# Patient Record
Sex: FEMALE | Race: BLACK OR AFRICAN AMERICAN | NOT HISPANIC OR LATINO | Employment: FULL TIME | URBAN - METROPOLITAN AREA
[De-identification: names, ages, dates, MRNs, and addresses within clinical notes are randomized per-mention and may not be internally consistent; named-entity substitution may affect disease eponyms.]

---

## 2020-05-12 ENCOUNTER — COMMUNICATION - HEALTHEAST (OUTPATIENT)
Dept: BEHAVIORAL HEALTH | Facility: CLINIC | Age: 20
End: 2020-05-12

## 2020-05-14 ENCOUNTER — OFFICE VISIT - HEALTHEAST (OUTPATIENT)
Dept: BEHAVIORAL HEALTH | Facility: CLINIC | Age: 20
End: 2020-05-14

## 2020-05-14 DIAGNOSIS — F11.93 WITHDRAWAL FROM OPIOIDS (H): ICD-10-CM

## 2020-12-18 ENCOUNTER — HOSPITAL ENCOUNTER (EMERGENCY)
Facility: CLINIC | Age: 20
Discharge: HOME | End: 2020-12-19
Attending: FAMILY MEDICINE | Admitting: FAMILY MEDICINE
Payer: COMMERCIAL

## 2020-12-18 DIAGNOSIS — R45.1 AGITATION: ICD-10-CM

## 2020-12-18 DIAGNOSIS — F11.24 OPIOID DEPENDENCE WITH OPIOID-INDUCED MOOD DISORDER (H): ICD-10-CM

## 2020-12-18 PROCEDURE — 99284 EMERGENCY DEPT VISIT MOD MDM: CPT | Performed by: FAMILY MEDICINE

## 2020-12-18 PROCEDURE — 99285 EMERGENCY DEPT VISIT HI MDM: CPT | Mod: 25

## 2020-12-18 PROCEDURE — 250N000011 HC RX IP 250 OP 636

## 2020-12-18 RX ORDER — OLANZAPINE 10 MG/2ML
10 INJECTION, POWDER, FOR SOLUTION INTRAMUSCULAR ONCE
Status: COMPLETED | OUTPATIENT
Start: 2020-12-18 | End: 2020-12-18

## 2020-12-18 RX ORDER — OLANZAPINE 10 MG/2ML
INJECTION, POWDER, FOR SOLUTION INTRAMUSCULAR
Status: COMPLETED
Start: 2020-12-18 | End: 2020-12-18

## 2020-12-18 RX ADMIN — OLANZAPINE 10 MG: 10 INJECTION, POWDER, LYOPHILIZED, FOR SOLUTION INTRAMUSCULAR at 16:13

## 2020-12-18 RX ADMIN — OLANZAPINE 10 MG: 10 INJECTION, POWDER, FOR SOLUTION INTRAMUSCULAR at 16:13

## 2020-12-18 NOTE — ED NOTES
Attempt to take off restraints due to patient being calm, but as restraints were coming off patient became combative with staff. Pt was told the criteria to get restraints taken off, and unable to demonstrate at this time.

## 2020-12-18 NOTE — ED TRIAGE NOTES
SPF states that patient states at a hotel that they have converted into a homeless shelter, had a manic episode, and made her way to [facility] but was discharged, and then made her way back to the facility, they state that she can flip out really quick.

## 2020-12-18 NOTE — ED NOTES
Bed: ED11  Expected date:   Expected time:   Means of arrival:   Comments:  SPF [age] F needs security

## 2020-12-18 NOTE — ED PROVIDER NOTES
ED Provider Note  [facility]      History     Chief Complaint   Patient presents with     Aggressive Behavior     HPI  Russell Lynn is a 20 year old female who presents to the ED today via EMS from [facility] for aggressive behavior. Per [facility] staff the patient was brought there by her shelter manager. They had gotten in a confrontation and the patient was highly agitated. She was put in a room and became very confrontational with the ED staff, accusing them of racism. She was given 1 mg of Ativan. When the nursing staff came in to check on her she was attempting to steal containers of wipes and gloves. They attempted to discharge the patient and noticed that she had wash cloths and other supplies stuffed into her bag. She was then asked to leave the hospital. She came back into the lobby a second time and was asked to leave and escorted out. She then came in a third time and urinated in the lobby of the ED. [facility] was then called and brought the patient here.  On arrival here the patient was extremely agitated and oppositional became physically aggressive and almost immediately required a 21 alert with restraints and medication including IM Zyprexa.  Patient then was very sleepy did not have any obvious harm secondary to the initial restraint and was continuing to remain sedated when taken out of restraints.  I was able to interview the supervisor of the shelter in which the patient is currently living she said that she has had a history of manic behaviors but today was an extreme case of richard for her.    Past Medical History  History reviewed. No pertinent past medical history.  History reviewed. No pertinent surgical history.  No current outpatient medications on file.    No Known Allergies  Family History  No family history on file.  Social History   Social History     Tobacco Use     Smoking status: None   Substance Use Topics     Alcohol use: None     Drug use:  None      Past medical history, past surgical history, medications, allergies, family history, and social history were reviewed with the patient. No additional pertinent items.       Review of Systems  A complete review of systems was performed with pertinent positives and negatives noted in the HPI, and all other systems negative.    Physical Exam   BP: (!) 85/49  Pulse: 96  Temp: 96.3  F (35.7  C)  SpO2: 98 %  Physical Exam  Constitutional:       General: She is not in acute distress.     Appearance: She is not diaphoretic.   HENT:      Head: Atraumatic.      Mouth/Throat:      Pharynx: No oropharyngeal exudate.   Eyes:      General: No scleral icterus.     Pupils: Pupils are equal, round, and reactive to light.   Cardiovascular:      Heart sounds: Normal heart sounds.   Pulmonary:      Effort: No respiratory distress.      Breath sounds: Normal breath sounds.   Abdominal:      General: Bowel sounds are normal.      Palpations: Abdomen is soft.      Tenderness: There is no abdominal tenderness.   Musculoskeletal:         General: No tenderness.   Skin:     General: Skin is warm.      Findings: No rash.   Neurological:      General: No focal deficit present.      Motor: No weakness.      Coordination: Coordination normal.   Psychiatric:         Mood and Affect: Mood is anxious. Affect is labile and angry.         Speech: Speech is rapid and pressured.         Behavior: Behavior is agitated and aggressive.         Judgment: Judgment is impulsive.       ED Course      Procedures    Patient has not been cooperative to have an assessment done at some point patient will be assessed.    No results found for any visits on 12/18/20.  Medications   OLANZapine (zyPREXA) injection 10 mg (has no administration in time range)   OLANZapine (zyPREXA) 10 MG injection (has no administration in time range)   OLANZapine (zyPREXA) injection 10 mg (10 mg Intramuscular Given 12/18/20 1613)        Assessments & Plan (with Medical  Decision Making)       I have reviewed the nursing notes. I have reviewed the findings, diagnosis, plan and need for follow up with the patient.    Patient with history of bipolar disorder now with severe manic episode physically aggressive and violent requiring 21 alert and sedation patient will remain in the emergency room until bed becomes available on inpatient unit she will be admitted on a 72-hour hold.    Final diagnoses:   Bipolar affective disorder, currently manic, severe, with psychotic features (H)       --  Pawel Mariee MD  [facility] EMERGENCY DEPARTMENT  12/18/2020     Pawel Mariee MD  12/19/20 0041

## 2020-12-18 NOTE — ED AVS SNAPSHOT
[facility] Emergency Department  [address]  [place] [address]  Phone: [phone]  Fax: [phone]                                    Russell Lynn   MRN: [id number]    Department: [facility] Emergency Department   Date of Visit: 12/18/2020           After Visit Summary Signature Page    I have received my discharge instructions, and my questions have been answered. I have discussed any challenges I see with this plan with the nurse or doctor.    ..........................................................................................................................................  Patient/Patient Representative Signature      ..........................................................................................................................................  Patient Representative Print Name and Relationship to Patient    ..................................................               ................................................  Date                                   Time    ..........................................................................................................................................  Reviewed by Signature/Title    ...................................................              ..............................................  Date                                               Time          22EPIC Rev 08/18

## 2020-12-18 NOTE — ED NOTES
Pt arrived in ER and upon safety screen RN heard yelling from patient and she attempted to leave room, and then when on the ground began to hit her head on the floor and would not stop. At this time CODE 21 was called and patient was placed in five point restraints for patient safety by MD.

## 2020-12-19 VITALS
RESPIRATION RATE: 16 BRPM | DIASTOLIC BLOOD PRESSURE: 68 MMHG | HEART RATE: 109 BPM | SYSTOLIC BLOOD PRESSURE: 99 MMHG | TEMPERATURE: 96.3 F | OXYGEN SATURATION: 100 %

## 2020-12-19 PROCEDURE — 90791 PSYCH DIAGNOSTIC EVALUATION: CPT

## 2020-12-19 PROCEDURE — 250N000011 HC RX IP 250 OP 636

## 2020-12-19 RX ORDER — OLANZAPINE 10 MG/2ML
10 INJECTION, POWDER, FOR SOLUTION INTRAMUSCULAR ONCE
Status: COMPLETED | OUTPATIENT
Start: 2020-12-19 | End: 2020-12-19

## 2020-12-19 RX ORDER — OLANZAPINE 10 MG/2ML
INJECTION, POWDER, FOR SOLUTION INTRAMUSCULAR
Status: COMPLETED
Start: 2020-12-19 | End: 2020-12-19

## 2020-12-19 RX ADMIN — OLANZAPINE 10 MG: 10 INJECTION, POWDER, FOR SOLUTION INTRAMUSCULAR at 00:44

## 2020-12-19 RX ADMIN — OLANZAPINE 10 MG: 10 INJECTION, POWDER, LYOPHILIZED, FOR SOLUTION INTRAMUSCULAR at 00:44

## 2020-12-19 NOTE — ED NOTES
Patient is agitated and combative, ZYprexa IM ordered for the patient. Patient was de-escalated and agreed to take the IM medication. But due to patient's unpredictable behavior, writer told the patient that she will be held while the medication is given, and pt is agreeable. While giving the medication, patient moved her lower body causing the needle to go out and made a scratch on the injection site. Controlled bleeding, band aid applied. Medication and needle change and was given on the opposite side.

## 2020-12-19 NOTE — ED NOTES
Pt prior to CODE 21 woke up and made statements of self harm so was told that she needed to change out of clothing (had strings) and brought to bathroom where she could use and change into clothes, at that time she squatted on the floor and voided and peed on the scrubs that were offered. Upon walking back to there room she threw her cup of water on the floor and then she flipped the table at staff. CODE 21 was called due to patient not responding to verbal deescalation and would not agree to create a safe environment for herself. She was walked to the room and changed out of clothes and into scrubs. She was given food and water and warm blankets and a mat to sleep on. Pt is being monitored by a 1:1.

## 2020-12-19 NOTE — ED NOTES
Patient was signed out to me by day provider.  No issues overnight during my shift.  Patient is awaiting admission and will be signed out to the oncoming physician.     Dylon Colunga,   12/19/20 0405

## 2020-12-19 NOTE — DISCHARGE INSTRUCTIONS
Please follow up with a primary care doctor to make sure you are doing OK in the next week. Come back to the emergency department if you have any worsening symptoms.

## 2020-12-19 NOTE — ED NOTES
Search was performed on pt (pt. wantanika-ed and patted). Pt continues to remain in clothes that have strings (sweatshirt and pants). Unable to remove clothing items/strings due to pt in restraints.

## 2020-12-19 NOTE — ED PROVIDER NOTES
Emergency Department Patient Sign-out       Brief HPI and ED course:  Patient is a 20 year old female signed out to me by Dr. Colunga.  See initial ED Provider note for details of the presentation. In brief, 20F from [facility] due to aggressive behavior. She was acting bizarre there and was taken by [facility] to [facility]. Physically aggressive on arrival here and given IM Zyprexa. Patient with manic behaviors in the past.     She is on a 72 hour hold will likely be admitted per prior providers to inpatient psych.    Vitals:   Patient Vitals for the past 24 hrs:   BP Temp Temp src Pulse SpO2   12/18/20 1634 (!) 85/49 96.3  F (35.7  C) Tympanic 96 98 %       Received Sign-out Plan:    Pending studies include: DEC assessment    Plan:   -Admit to psych likely, needs DEC assessment    Events after assuming care:  After care was assumed, a focused history and physical was performed. Agree with findings relayed by previous provider.     Spoke with Jillian, the DEC  and we agreed based on our individual assessments that the patient has behavior problems and was agitated but now is clear, not psychotic, not on medications, not suicidal or homicidal.     I don't think that there is a need at this time to further hold the patient or sedate the patient as she is calm and cooperative and she has a place to go. Contracted for safety.     --  Isaiah Barrett MD   Emergency Medicine     Isaiah Barrett MD  12/19/20 6121

## 2020-12-19 NOTE — ED NOTES
Within an hour after restraint an in person face to face assessment was completed at 1630, including an evaluation of the patient's immediate reaction to the intervention, behavioral assessment and review/assessment of history, drugs and medications, recent labs, etc., and behavioral condition.  The patient experienced: No adverse physical outcome from seclusion/restraint initiation.  The intervention of restraint or seclusion needs to continue.     Pawel Mariee MD  12/19/20 0024

## 2020-12-19 NOTE — ED NOTES
Report taken from JEEVAN Rosario. Patient is currently sleeping in seclusion room but with door unlocked.

## 2020-12-19 NOTE — ED NOTES
Seclusion has been discontinued, pt is able to leave room freely. At this time patient is calm, sleeping/resting and is no longer showing behaviors of hurting herself.

## 2021-06-04 VITALS — SYSTOLIC BLOOD PRESSURE: 116 MMHG | WEIGHT: 133 LBS | HEART RATE: 81 BPM | DIASTOLIC BLOOD PRESSURE: 67 MMHG

## 2021-06-08 NOTE — PROGRESS NOTES
Assessment/COWS performed by RN Mercedes LUZ  While the RN stepped out the room to give report to provider, pt got up and started pacing in the room complaining of severe pain / discomfort repeatedly saying that she wants to  lie down on the exam table. This writer stopped her from doing this due to risk of fall. After pacing for another minute pt suddenly sat, then laid down on the floor. Her responses were delayed, eyes mostly closed, and this writer had to tap her back a few times in order to get response. This writer called for help then clinic staff arrived shortly along with clinic manager and code 9 team.

## 2021-06-08 NOTE — PROGRESS NOTES
Spoke to Dr. Berumen who directed patient go to ER due to withdrawal.  Patient scored 11 on COWS and said she had not slept in a few days.  She said she last used fentanyl  Monday 5-11-20 . Patient said she went to ER in [place] yesterday for Nausea, vomiting and diarrhea.  She was drinking water during the evaluation without any nausea/vomiting.  She said she was very tired due to lack of sleep. Patient said she is not currently on any medications.

## 2021-06-08 NOTE — TELEPHONE ENCOUNTER
Attempted to reach patient, but number listed is a non-working number. Pt was referred to us by [facility] via the bridging service.

## 2021-09-17 ENCOUNTER — OFFICE VISIT (OUTPATIENT)
Dept: BEHAVIORAL HEALTH | Facility: CLINIC | Age: 21
End: 2021-09-17
Payer: COMMERCIAL

## 2021-09-17 ENCOUNTER — TELEPHONE (OUTPATIENT)
Dept: BEHAVIORAL HEALTH | Facility: CLINIC | Age: 21
End: 2021-09-17

## 2021-09-17 DIAGNOSIS — F11.20 OPIOID USE DISORDER, SEVERE, DEPENDENCE (H): Primary | ICD-10-CM

## 2021-09-17 DIAGNOSIS — F43.23 ADJUSTMENT DISORDER WITH MIXED ANXIETY AND DEPRESSED MOOD: Primary | ICD-10-CM

## 2021-09-17 DIAGNOSIS — F11.20 OPIOID USE DISORDER, SEVERE, DEPENDENCE (H): ICD-10-CM

## 2021-09-17 DIAGNOSIS — F17.200 TOBACCO USE DISORDER: ICD-10-CM

## 2021-09-17 LAB
FENTANYL UR QL: ABNORMAL
HCG UR QL: NEGATIVE

## 2021-09-17 PROCEDURE — 80307 DRUG TEST PRSMV CHEM ANLYZR: CPT | Performed by: FAMILY MEDICINE

## 2021-09-17 PROCEDURE — 90832 PSYTX W PT 30 MINUTES: CPT | Performed by: SOCIAL WORKER

## 2021-09-17 PROCEDURE — 81025 URINE PREGNANCY TEST: CPT | Performed by: FAMILY MEDICINE

## 2021-09-17 PROCEDURE — 99204 OFFICE O/P NEW MOD 45 MIN: CPT | Performed by: FAMILY MEDICINE

## 2021-09-17 RX ORDER — BUPRENORPHINE AND NALOXONE 8; 2 MG/1; MG/1
1 FILM, SOLUBLE BUCCAL; SUBLINGUAL 2 TIMES DAILY
Qty: 30 FILM | Refills: 0
Start: 2021-09-17 | End: 2021-09-24

## 2021-09-17 RX ORDER — BUPRENORPHINE AND NALOXONE 8; 2 MG/1; MG/1
1 FILM, SOLUBLE BUCCAL; SUBLINGUAL 2 TIMES DAILY
Qty: 30 FILM | Refills: 0 | Status: SHIPPED | OUTPATIENT
Start: 2021-09-17 | End: 2021-09-24

## 2021-09-17 NOTE — PROGRESS NOTES
[facility]   September 17, 2021      Behavioral Health Clinician Progress Note    Patient Name: Russell Lynn           Service Type:  Individual      Service Location:   Face to Face in Clinic     Session Start Time: 2:00pm Session End Time: 2:20pm      Session Length: 16 - 37      Attendees: Patient    Visit Activities (Refresh list every visit): Psychotherapy    Diagnostic Assessment Date: Not completed yet  Treatment Plan Review Date: Not completed yet   See Flowsheets for today's PHQ-9 and ILIANA-7 results  Previous PHQ-9: No flowsheet data found.  Previous ILIANA-7: No flowsheet data found.    MELANIE LEVEL:  No flowsheet data found.    DATA  Extended Session (60+ minutes): No  Interactive Complexity: No  Crisis: No  [facility] Patient: No    Treatment Objective(s) Addressed in This Session:  Target Behavior(s): mental health and addiction    Depressed Mood: Increase interest, engagement, and pleasure in doing things  Decrease frequency and intensity of feeling down, depressed, hopeless  Identify negative self-talk and behaviors: challenge core beliefs, myths, and actions  Improve concentration, focus, and mindfulness in daily activities   Decrease thoughts that you'd be better off dead or of suicide / self-harm  Anxiety: will experience a reduction in anxiety and will develop more effective coping skills to manage anxiety symptoms  Alcohol / Substance Use: will discuss/consider potential need for formal substance use evaluation, treatment and/or support  continue to make healthy choices regarding substance use and engage in activities / supportive services that promote sobriety    Current Stressors / Issues:  2:00pm to 2:20pm    She has been withdrawing for 4 days and she wants to be prescribed suboxone-she has tried methodone and that is not helpful as she was still able to get high she wants to block her ability to get high-she lives with her partner and some other people-she stated  that she is the only person that uses opiates-she believes is living in a safe place to stay sober-she has done some research about soboxone and that it seems like a better idea and that it will calm down physical symptoms of withdraw-once she gets sober she will go back to the old me that was work hard and having money and spending time with family and going to the and fishing, not controled by any chemical-she will work with the doctor that cares and will help me with getting on the medication and getting off the medications-regarding sleeping really bad-mood has been low and has history of suicidal thoughts-anger issues and depression and anxiety and guilt-no suicidal thoughts at this moment and she was able to contract for safety-senior living history-and she asked about some medications to help with sleeping needed at this appointment.          Progress on Treatment Objective(s) / Homework:  No improvement - CONTEMPLATION (Considering change and yet undecided); Intervened by assessing the negative and positive thinking (ambivalence) about behavior change    Motivational Interviewing    MI Intervention: Expressed Empathy/Understanding, Supported Autonomy, Collaboration, Evocation, Permission to raise concern or advise, Open-ended questions, Reflections: simple and complex and Change talk (evoked)     Change Talk Expressed by the Patient: Ability to change Reasons to change Need to change Activation Taking steps    Provider Response to Change Talk: E - Evoked more info from patient about behavior change, A - Affirmed patient's thoughts, decisions, or attempts at behavior change, R - Reflected patient's change talk and S - Summarized patient's change talk statements      Care Plan review completed: No    Medication Review:  No changes to current psychiatric medication(s)    Medication Compliance:  NA    Changes in Health Issues:   None reported    Chemical Use Review:   Substance Use: Chemical use reviewed, no active  concerns identified has been sober for the past 4 days     Tobacco Use: not reported    Assessment: Current Emotional / Mental Status (status of significant symptoms):  Risk status (Self / Other harm or suicidal ideation)  Patient has had a history of suicidal ideation: yes in the past  Patient denies current fears or concerns for personal safety.  Patient denies current or recent suicidal ideation or behaviors.  Patient denies current or recent homicidal ideation or behaviors.  Patient denies current or recent self injurious behavior or ideation.  Patient denies other safety concerns.  A safety and risk management plan has not been developed at this time, however patient was encouraged to call [place] [phone] should there be a change in any of these risk factors.    Appearance:   Appropriate   Eye Contact:   Good   Psychomotor Behavior: Normal   Attitude:   Cooperative   Orientation:   All  Speech   Rate / Production: Normal/ Responsive Normal    Volume:  Normal   Mood:    Anxious  Normal  Affect:    Appropriate   Thought Content:  Clear   Thought Form:  Coherent  Goal Directed  Logical   Insight:    Fair     Diagnoses:  1. Adjustment disorder with mixed anxiety and depressed mood    2. Opioid use disorder, severe, dependence (H)        Collateral Reports Completed:  Not Applicable    Plan: (Homework, other):  Patient was given information about behavioral services and encouraged to schedule a follow up appointment with the clinic [facility] as needed.  She was also given information about mental health symptoms and treatment options  and strategies to maintain sobriety.  CD Recommendations: Maintain Sobriety.   MARIO Mcfadden

## 2021-09-17 NOTE — PROGRESS NOTES
[facility] Initial Visit    ASSESSMENT/PLAN                                                      1. Opioid use disorder, severe, dependence (H)  Pt reports a ~3 year history of using fentanyl by intranasl route(s.)  Last reported use 9/12/21.  Pt experiencing severe opioid withdrawal currently.  Pt is interested in starting buprenorphine for treatment.     Start buprenorphine with 4mg, titrate up to 16mg/day  Baseline labs discussed, will obtain next visit per pt request due to current withdrawal symptoms.      - HCG qualitative urine  - Fentanyl Urine, Qualitative  - naloxone (NARCAN) 4 MG/0.1ML nasal spray; Spray 1 spray (4 mg) into one nostril alternating nostrils once as needed for opioid reversal every 2-3 minutes until assistance arrives  Dispense: 0.2 mL; Refill: 11  - buprenorphine HCl-naloxone HCl (SUBOXONE) 8-2 MG per film; Place 1 Film under the tongue 2 times daily After following directions to start buprenorphine  Dispense: 30 Film; Refill: 0    2. Tobacco use disorder  Evaluate pt's readiness to quit future visits       Return for 1-2 weeks, Follow up, with me, in person.        SUBJECTIVE                                                      CC/HPI:  Russell Lynn is a 29 year old female with PMH anxiety, depression, and opioid use disorder who presents to the [facility] for initial visit.      Substance Use History :  Opioids: First use: 11/2018    Most recent use: substance: perc 30/fentanyl quantity 4-5 pills daily route: snorting timing of last use: 9/12/2021     IV drug use: No   History of overdose: Yes: 5/2020  Previous treatments : Yes: methadone x 1 1/2 yrs through [facility]; stopped methadone abruptly ~5/2021  Longest period of sobriety: 8 months  Medical complications related to substance use: denies  Hepatitis C Status  negative  HIV Status negative    Other recent substance use:  Stimulants: last used meth 2 weeks  ago  Sedatives/hypnotics/anxiolytics:last used a year ago  Alcohol:denies  Tobacco:currently  Cannabis:currently  Hallucinogens:last used 2-4 weeks ago  Behavioral addictions: denies      [place] Prescription Drug Monitoring Program Reviewed:  Yes; as expected        History reviewed. No pertinent past medical history.        PAST PSYCHIATRIC HISTORY:  Diagnoses- PTSD, depression and anxiety   Suicide Attempts: Yes year ago   Hospitalizations: Yes a year ago     History reviewed. No pertinent surgical history.      Medications:  No current outpatient medications on file prior to visit.  No current facility-administered medications on file prior to visit.       No Known Allergies      No family history on file.      Social History  Housing status: with boyfriend and his step dad  Employment status: Unemployed, not seeking work  Relationship status: Partnered  Children: 1 child          REVIEW OF SYSTEMS:  General: Withdrawal symptoms as described below.  No recent fevers.   Eyes:  No vision concerns.  No jaundice.    Resp: No coughing, wheezing or shortness of breath  CV: No chest pains or palpitations  GI: No complaints other than as above  : No urinary frequency or dysuria, no discharge  Musculoskeletal: No significant muscle or joint pains other than as above.  No edema  Neurologic: No numbness, tingling, weakness, problems with balance or coordination  Psychiatric: No acute concerns other than as above.   Skin: No rashes or areas of acute infection    OBJECTIVE                                                        Clinical Opioid Withdrawal Scale (COWS)    Resting Pulse Rate  1  =     Sweating    (over past 1/2 hour) 2  =  flushed or observable moistness on face   Restlessness  3  =  frequent shifting or extraneous movements of legs/arms   Pupil size  1  =  pupils possibly larger than normal for room light   Bone or Joint Aches    (acute only) 2  =  patient reports severe diffuse aching of  "joints/muscles   Runny nose or tearing    (unrelated to cold/allergies) 2  =  nose running or tearing   GI Upset    (over past 1/2 hour) 2  =  nausea or loose stool   Tremor    (outstretched hands) 2  =  slight tremor observable   Yawning    (during assessment) 1  =  yawning once or twice during assessment   Anxiety/Irritability 2  =  patient obviously irritable or anxious   Gooseflesh skin 3  =  piloerection or skin can be felt or hairs standing up on arms     TOTAL SCORE  Add column for score   21       LMP 09/09/2021   /74   Pulse 90   Ht 1.676 m (5' 6\")   Wt 58.1 kg (128 lb)   LMP 09/09/2021   BMI 20.66 kg/m    Physical Exam  Constitutional:       Appearance: She is diaphoretic.      Comments: Seated in exam chair, appears uncomfortable due to withdrawal, changes positions frequently, answers questions with short responses, good eye contact   HENT:      Head: Normocephalic and atraumatic.      Nose: Rhinorrhea present.   Eyes:      General: No scleral icterus.  Pulmonary:      Effort: Pulmonary effort is normal.   Neurological:      Mental Status: She is alert and oriented to person, place, and time.      Motor: Tremor present.      Coordination: Coordination normal.      Gait: Gait normal.   Psychiatric:         Attention and Perception: Attention and perception normal.         Mood and Affect: Mood is anxious. Affect is flat.         Speech: Speech normal.         Behavior: Behavior is cooperative.         Thought Content: Thought content normal.      Comments: Insight and judgement are fair            Labs:    UDS: methamphetamines and THC  *POC urine drug screen does not screen for Fentanyl    Recent Results (from the past 240 hour(s))   HCG qualitative urine    Collection Time: 09/17/21  3:12 PM   Result Value Ref Range    hCG Urine Qualitative Negative Negative             Patient counseling completed today:  Discussed mechanism of action, potential risks/benefits/side effects of medications and " other recommendations above.  Discussed risk of precipitated withdrawal with initiation of buprenorphine in the presence of full opioid agonists.  Reviewed directions for initiation of buprenorphine to reduce risk of precipitated withdrawal and maximize efficacy.  Recommend pt keep naloxone in their possession and reviewed other aspects of harm reduction to reduce risk of overdose with return to use.   Recommended avoiding concurrent use of alcohol, benzodiazepines or other sedatives with buprenorphine or other opioids.  Discussed importance of avoiding isolation, building a network of supportive relationships, avoiding people/places/things associated with past use to reduce risk of relapse; including motivational interviewing regarding psychosocial treatment for addiction.     At least 45 min spent in review of medical record,  review, obtaining histories, reviewing symptoms, discussing pt's goals for treatment, counseling noted above.    [facility]  [address] [place] [address] [id number] [phone]

## 2021-09-24 ENCOUNTER — OFFICE VISIT (OUTPATIENT)
Dept: BEHAVIORAL HEALTH | Facility: CLINIC | Age: 21
End: 2021-09-24
Payer: COMMERCIAL

## 2021-09-24 VITALS — HEART RATE: 102 BPM | SYSTOLIC BLOOD PRESSURE: 116 MMHG | DIASTOLIC BLOOD PRESSURE: 69 MMHG

## 2021-09-24 DIAGNOSIS — F17.200 TOBACCO USE DISORDER: ICD-10-CM

## 2021-09-24 DIAGNOSIS — F11.20 OPIOID USE DISORDER, SEVERE, DEPENDENCE (H): Primary | ICD-10-CM

## 2021-09-24 DIAGNOSIS — F43.10 PTSD (POST-TRAUMATIC STRESS DISORDER): ICD-10-CM

## 2021-09-24 DIAGNOSIS — Z11.3 SCREEN FOR STD (SEXUALLY TRANSMITTED DISEASE): ICD-10-CM

## 2021-09-24 LAB
ALBUMIN SERPL-MCNC: 4.2 G/DL (ref 3.4–5)
ALP SERPL-CCNC: 87 U/L (ref 40–150)
ALT SERPL W P-5'-P-CCNC: 21 U/L (ref 0–50)
ANION GAP SERPL CALCULATED.3IONS-SCNC: 6 MMOL/L (ref 3–14)
AST SERPL W P-5'-P-CCNC: 15 U/L (ref 0–45)
BASOPHILS # BLD AUTO: 0 10E3/UL (ref 0–0.2)
BASOPHILS NFR BLD AUTO: 0 %
BILIRUB SERPL-MCNC: 1 MG/DL (ref 0.2–1.3)
BUN SERPL-MCNC: 8 MG/DL (ref 7–30)
CALCIUM SERPL-MCNC: 9.1 MG/DL (ref 8.5–10.1)
CHLORIDE BLD-SCNC: 108 MMOL/L (ref 94–109)
CO2 SERPL-SCNC: 25 MMOL/L (ref 20–32)
CREAT SERPL-MCNC: 0.81 MG/DL (ref 0.52–1.04)
EOSINOPHIL # BLD AUTO: 0.1 10E3/UL (ref 0–0.7)
EOSINOPHIL NFR BLD AUTO: 1 %
ERYTHROCYTE [DISTWIDTH] IN BLOOD BY AUTOMATED COUNT: 14 % (ref 10–15)
GFR SERPL CREATININE-BSD FRML MDRD: >90 ML/MIN/1.73M2
GLUCOSE BLD-MCNC: 99 MG/DL (ref 70–99)
HBV CORE AB SERPL QL IA: NONREACTIVE
HBV SURFACE AB SERPL IA-ACNC: 0.76 M[IU]/ML
HBV SURFACE AG SERPL QL IA: NONREACTIVE
HCT VFR BLD AUTO: 41.9 % (ref 35–47)
HCV AB SERPL QL IA: NONREACTIVE
HGB BLD-MCNC: 13.2 G/DL (ref 11.7–15.7)
HIV 1+2 AB+HIV1 P24 AG SERPL QL IA: NONREACTIVE
IMM GRANULOCYTES # BLD: 0 10E3/UL
IMM GRANULOCYTES NFR BLD: 0 %
LYMPHOCYTES # BLD AUTO: 2 10E3/UL (ref 0.8–5.3)
LYMPHOCYTES NFR BLD AUTO: 22 %
MCH RBC QN AUTO: 28.7 PG (ref 26.5–33)
MCHC RBC AUTO-ENTMCNC: 31.5 G/DL (ref 31.5–36.5)
MCV RBC AUTO: 91 FL (ref 78–100)
MONOCYTES # BLD AUTO: 0.5 10E3/UL (ref 0–1.3)
MONOCYTES NFR BLD AUTO: 6 %
NEUTROPHILS # BLD AUTO: 6.5 10E3/UL (ref 1.6–8.3)
NEUTROPHILS NFR BLD AUTO: 71 %
NRBC # BLD AUTO: 0 10E3/UL
NRBC BLD AUTO-RTO: 0 /100
PLATELET # BLD AUTO: 223 10E3/UL (ref 150–450)
POTASSIUM BLD-SCNC: 4.1 MMOL/L (ref 3.4–5.3)
PROT SERPL-MCNC: 7.6 G/DL (ref 6.8–8.8)
RBC # BLD AUTO: 4.6 10E6/UL (ref 3.8–5.2)
SODIUM SERPL-SCNC: 139 MMOL/L (ref 133–144)
T PALLIDUM AB SER QL: NONREACTIVE
WBC # BLD AUTO: 9.2 10E3/UL (ref 4–11)

## 2021-09-24 PROCEDURE — 85025 COMPLETE CBC W/AUTO DIFF WBC: CPT | Performed by: FAMILY MEDICINE

## 2021-09-24 PROCEDURE — 87389 HIV-1 AG W/HIV-1&-2 AB AG IA: CPT | Performed by: FAMILY MEDICINE

## 2021-09-24 PROCEDURE — 86803 HEPATITIS C AB TEST: CPT | Performed by: FAMILY MEDICINE

## 2021-09-24 PROCEDURE — 86780 TREPONEMA PALLIDUM: CPT | Performed by: FAMILY MEDICINE

## 2021-09-24 PROCEDURE — 86704 HEP B CORE ANTIBODY TOTAL: CPT | Performed by: FAMILY MEDICINE

## 2021-09-24 PROCEDURE — 80053 COMPREHEN METABOLIC PANEL: CPT | Performed by: FAMILY MEDICINE

## 2021-09-24 PROCEDURE — 99214 OFFICE O/P EST MOD 30 MIN: CPT | Performed by: FAMILY MEDICINE

## 2021-09-24 PROCEDURE — 86706 HEP B SURFACE ANTIBODY: CPT | Performed by: FAMILY MEDICINE

## 2021-09-24 PROCEDURE — 87491 CHLMYD TRACH DNA AMP PROBE: CPT | Performed by: FAMILY MEDICINE

## 2021-09-24 PROCEDURE — 87591 N.GONORRHOEAE DNA AMP PROB: CPT | Performed by: FAMILY MEDICINE

## 2021-09-24 PROCEDURE — 36415 COLL VENOUS BLD VENIPUNCTURE: CPT | Performed by: FAMILY MEDICINE

## 2021-09-24 PROCEDURE — 87340 HEPATITIS B SURFACE AG IA: CPT | Performed by: FAMILY MEDICINE

## 2021-09-24 RX ORDER — BUPRENORPHINE AND NALOXONE 8; 2 MG/1; MG/1
1 FILM, SOLUBLE BUCCAL; SUBLINGUAL 2 TIMES DAILY
Qty: 45 FILM | Refills: 0 | Status: SHIPPED | OUTPATIENT
Start: 2021-09-24 | End: 2021-09-24

## 2021-09-24 RX ORDER — IBUPROFEN 800 MG/1
TABLET, FILM COATED ORAL
COMMUNITY
Start: 2021-02-09 | End: 2021-11-08

## 2021-09-24 RX ORDER — PRAZOSIN HYDROCHLORIDE 1 MG/1
1-3 CAPSULE ORAL AT BEDTIME
Qty: 90 CAPSULE | Refills: 1 | Status: SHIPPED | OUTPATIENT
Start: 2021-09-24 | End: 2021-09-24

## 2021-09-24 RX ORDER — BUPRENORPHINE AND NALOXONE 8; 2 MG/1; MG/1
1 FILM, SOLUBLE BUCCAL; SUBLINGUAL 2 TIMES DAILY
Qty: 90 FILM | Refills: 0 | Status: SHIPPED | OUTPATIENT
Start: 2021-09-24 | End: 2021-09-27

## 2021-09-24 RX ORDER — PRAZOSIN HYDROCHLORIDE 1 MG/1
1-3 CAPSULE ORAL AT BEDTIME
Qty: 90 CAPSULE | Refills: 1 | Status: SHIPPED | OUTPATIENT
Start: 2021-09-24 | End: 2021-11-08

## 2021-09-24 NOTE — NURSING NOTE
[facility]      Rooming information:  Approximate last use of illicit opioids: 9/12/2021  Taking buprenorphine? Yes:  As prescribed? No, was taking 3 a day in the beginning    Number of buprenorphine films/tablets remaining currently: unsure  Side effects related to buprenorphine (constipation, dry mouth, sedation?) No   Narcan currently available: Yes  Other recent substance use:    NICOTINE- Yes-vape   Cannabis   If using nicotine, ready to quit? No    Point of care urine drug screen positive for: buprenorphine and THC  *[facility] urine drug screen does not screen for Fentanyl    Pregnancy Status   LMP: 9/9/2021  Birth control/barriers: denies  Interested in birth control if none currently? No  Urine pregnancy test specimen obtained and sent to lab:No negative on 9/17/2021    Insurance needs: active    Housing needs: stable    Contact information up to date? yes    3rd Party Involvement none today  (please obtain DORA if pt would like to include)    Primary care provider: Physician No Ref-Primary     Mental health provider: not currently  (follow up on MH referral if needed)    Marie Lau CMA  September 24, 2021  11:06 AM

## 2021-09-24 NOTE — PROGRESS NOTES
[facility] Return Visit    ASSESSMENT/PLAN                                                    1. Opioid use disorder, severe, dependence (H)  Pt tolerated initiation of buprenorphine, reporting control of symptoms with 24mg/day  Continue buprenorphine, prescribing at increased dose f 24mg/day  Pt agreeable to individual therapy with Grady CHACON at her next appointment  - CBC with Platelets & Differential; Future  - COMPREHENSIVE METABOLIC PANEL; Future  - Hepatitis B core antibody; Future  - Hepatitis B Surface Antibody; Future  - Hepatitis B surface antigen; Future  - Hepatitis C Screen Reflex to HCV RNA Quant and Genotype; Future  - HIV Antigen Antibody Combo; Future  - buprenorphine HCl-naloxone HCl (SUBOXONE) 8-2 MG per film; Place 1 Film under the tongue 3 times daily  Dispense: 90 Film; Refill: 0  - naloxone (NARCAN) 4 MG/0.1ML nasal spray; Spray 1 spray (4 mg) into one nostril alternating nostrils once as needed for opioid reversal every 2-3 minutes until assistance arrives  Dispense: 0.2 mL; Refill: 11  - CBC with Platelets & Differential  - COMPREHENSIVE METABOLIC PANEL  - Hepatitis B core antibody  - Hepatitis B Surface Antibody  - Hepatitis B surface antigen  - Hepatitis C Screen Reflex to HCV RNA Quant and Genotype  - HIV Antigen Antibody Combo    2. Tobacco use disorder  Not ready to quit at this time    3. Screen for STD (sexually transmitted disease)  - NEISSERIA GONORRHOEA PCR  - CHLAMYDIA TRACHOMATIS PCR  - Hepatitis B Surface Antibody; Future  - Hepatitis B surface antigen; Future  - Hepatitis C Screen Reflex to HCV RNA Quant and Genotype; Future  - HIV Antigen Antibody Combo; Future  - Treponema Abs w Reflex to RPR and Titer; Future  - Hepatitis B Surface Antibody  - Hepatitis B surface antigen  - Hepatitis C Screen Reflex to HCV RNA Quant and Genotype  - HIV Antigen Antibody Combo  - Treponema Abs w Reflex to RPR and Titer    4. PTSD (post-traumatic stress disorder)  Pt  describing symptoms consistent with nightmares in setting of PTSD.  Recalls previous experience with prazosin which she tolerated.   rx for prazosin as noted  - prazosin (MINIPRESS) 1 MG capsule; Take 1-3 capsules (1-3 mg) by mouth At Bedtime As needed for nightmares  Dispense: 90 capsule; Refill: 1           Return in about 2 weeks (around 10/8/2021).        SUBJECTIVE                                                      CC/HPI:  Russell Lynn is a 21 year old   female with PMH anxiety, depression, PTSD, and opioid use disorder on buprenorphine who presents to the [facility] for return visit.      Brief History:  Pt was first evaluated in [facility] on 9/17/21.  At that time she reported ~3 yr h/o intranasal fentanyl use.  She started buprenorphine through [facility] on 9/17/2.    Substance Use History :  Opioids: First use: 11/2018    Most recent use: substance: perc 30/fentanyl quantity 4-5 pills daily route: snorting timing of last use: 9/12/2021     IV drug use: No   History of overdose: Yes: 5/2020  Previous treatments : Yes: methadone x 1 1/2 yrs through [place] [facility]; stopped methadone abruptly ~5/2021  Longest period of sobriety: 8 months  Medical complications related to substance use: denies  Hepatitis C Status  9/24/21 HCV ab nonreactive  HIV Status 9/24/21 HIV ag/ab nonreactive    Other recent substance use:  Stimulants: last used meth 2 weeks ago  Sedatives/hypnotics/anxiolytics:last used a year ago  Alcohol:denies  Tobacco:currently  Cannabis:currently  Hallucinogens:last used 2-4 weeks ago  Behavioral addictions: denies      Pt was last seen on 9/17/21.   A/P at that time was:  1. Opioid use disorder, severe, dependence (H)  Pt reports a ~3 year history of using fentanyl by intranasal route(s.)  Last reported use 9/12/21.  Pt experiencing severe opioid withdrawal currently.  Pt is interested in starting buprenorphine for treatment.     Start buprenorphine with 4mg, titrate up to  16mg/day  Baseline labs discussed, will obtain next visit per pt request due to current withdrawal symptoms.      - HCG qualitative urine  - Fentanyl Urine, Qualitative  - naloxone (NARCAN) 4 MG/0.1ML nasal spray; Spray 1 spray (4 mg) into one nostril alternating nostrils once as needed for opioid reversal every 2-3 minutes until assistance arrives  Dispense: 0.2 mL; Refill: 11  - buprenorphine HCl-naloxone HCl (SUBOXONE) 8-2 MG per film; Place 1 Film under the tongue 2 times daily After following directions to start buprenorphine  Dispense: 30 Film; Refill: 0    2. Tobacco use disorder  Evaluate pt's readiness to quit future visits      Today, pt states she was able to start buprenorphine after our last visit, and was taking 24mg/day to adequately treat symptoms.  She has not had any other opioid use since 9/12/21. No c/o significant side effects.     Pt states she is sleeping better, but still waking up every night with symptoms of heart racing, extreme anxiety.        [place] Prescription Drug Monitoring Program Reviewed:  Yes; as expected  #30 8mg/2mg films filled 9/17/21        No past medical history on file.        PAST PSYCHIATRIC HISTORY:  Diagnoses- PTSD, depression and anxiety   Suicide Attempts: Yes 2020  Hospitalizations: Yes 2020     No past surgical history on file.      Medications:  Current Outpatient Medications   Medication Instructions     buprenorphine HCl-naloxone HCl (SUBOXONE) 8-2 MG per film 1 Film, Sublingual, 2 TIMES DAILY     ibuprofen (ADVIL/MOTRIN) 800 MG tablet TAKE 1 TABLET BY MOUTH EVERY 6 TO 8 HOURS AS NEEDED     naloxone (NARCAN) 4 mg, Alternating Nostrils, ONCE PRN, every 2-3 minutes until assistance arrives                No Known Allergies      No family history on file.      Social History  Housing status: with boyfriend and his step dad  Employment status: Unemployed, not seeking work  Relationship status: Partnered  Children: 1 child          REVIEW OF SYSTEMS:  No other  concerns      OBJECTIVE                                                        /69   Pulse 102   LMP 09/09/2021     Physical Exam  Constitutional:       Appearance: Normal appearance.   HENT:      Head: Normocephalic and atraumatic.   Eyes:      General: No scleral icterus.     Extraocular Movements: Extraocular movements intact.      Conjunctiva/sclera: Conjunctivae normal.   Pulmonary:      Effort: Pulmonary effort is normal.   Neurological:      Mental Status: She is alert and oriented to person, place, and time.      Motor: No tremor.      Coordination: Coordination normal.      Gait: Gait normal.   Psychiatric:         Attention and Perception: Attention and perception normal.         Mood and Affect: Mood and affect normal.         Speech: Speech normal.         Behavior: Behavior is cooperative.         Thought Content: Thought content normal.      Comments: Insight and judgement are fair to good           Labs:    UDS: buprenorphine and THC  *POC urine drug screen does not screen for Fentanyl    Recent Results (from the past 240 hour(s))   HCG qualitative urine    Collection Time: 09/17/21  3:12 PM   Result Value Ref Range    hCG Urine Qualitative Negative Negative   Fentanyl Urine, Qualitative    Collection Time: 09/17/21  3:12 PM   Result Value Ref Range    Fentanyl Qual Urine Screen Positive (A) Screen Negative   NEISSERIA GONORRHOEA PCR    Collection Time: 09/24/21 11:54 AM    Specimen: Urine, Voided   Result Value Ref Range    Neisseria gonorrhoeae Negative Negative   CHLAMYDIA TRACHOMATIS PCR    Collection Time: 09/24/21 11:54 AM    Specimen: Urine, Voided   Result Value Ref Range    Chlamydia trachomatis Negative Negative   COMPREHENSIVE METABOLIC PANEL    Collection Time: 09/24/21 12:05 PM   Result Value Ref Range    Sodium 139 133 - 144 mmol/L    Potassium 4.1 3.4 - 5.3 mmol/L    Chloride 108 94 - 109 mmol/L    Carbon Dioxide (CO2) 25 20 - 32 mmol/L    Anion Gap 6 3 - 14 mmol/L    Urea  Nitrogen 8 7 - 30 mg/dL    Creatinine 0.81 0.52 - 1.04 mg/dL    Calcium 9.1 8.5 - 10.1 mg/dL    Glucose 99 70 - 99 mg/dL    Alkaline Phosphatase 87 40 - 150 U/L    AST 15 0 - 45 U/L    ALT 21 0 - 50 U/L    Protein Total 7.6 6.8 - 8.8 g/dL    Albumin 4.2 3.4 - 5.0 g/dL    Bilirubin Total 1.0 0.2 - 1.3 mg/dL    GFR Estimate >90 >60 mL/min/1.73m2   Hepatitis B core antibody    Collection Time: 09/24/21 12:05 PM   Result Value Ref Range    Hepatitis B Core Antibody Total Nonreactive Nonreactive   Hepatitis B Surface Antibody    Collection Time: 09/24/21 12:05 PM   Result Value Ref Range    Hepatitis B Surface Antibody 0.76 <8.00 m[IU]/mL   Hepatitis B surface antigen    Collection Time: 09/24/21 12:05 PM   Result Value Ref Range    Hepatitis B Surface Antigen Nonreactive Nonreactive   Hepatitis C Screen Reflex to HCV RNA Quant and Genotype    Collection Time: 09/24/21 12:05 PM   Result Value Ref Range    Hepatitis C Antibody Nonreactive Nonreactive   HIV Antigen Antibody Combo    Collection Time: 09/24/21 12:05 PM   Result Value Ref Range    HIV Antigen Antibody Combo Nonreactive Nonreactive   Treponema Abs w Reflex to RPR and Titer    Collection Time: 09/24/21 12:05 PM   Result Value Ref Range    Treponema Antibody Total Nonreactive Nonreactive   CBC with platelets and differential    Collection Time: 09/24/21 12:05 PM   Result Value Ref Range    WBC Count 9.2 4.0 - 11.0 10e3/uL    RBC Count 4.60 3.80 - 5.20 10e6/uL    Hemoglobin 13.2 11.7 - 15.7 g/dL    Hematocrit 41.9 35.0 - 47.0 %    MCV 91 78 - 100 fL    MCH 28.7 26.5 - 33.0 pg    MCHC 31.5 31.5 - 36.5 g/dL    RDW 14.0 10.0 - 15.0 %    Platelet Count 223 150 - 450 10e3/uL    % Neutrophils 71 %    % Lymphocytes 22 %    % Monocytes 6 %    % Eosinophils 1 %    % Basophils 0 %    % Immature Granulocytes 0 %    NRBCs per 100 WBC 0 <1 /100    Absolute Neutrophils 6.5 1.6 - 8.3 10e3/uL    Absolute Lymphocytes 2.0 0.8 - 5.3 10e3/uL    Absolute Monocytes 0.5 0.0 - 1.3  10e3/uL    Absolute Eosinophils 0.1 0.0 - 0.7 10e3/uL    Absolute Basophils 0.0 0.0 - 0.2 10e3/uL    Absolute Immature Granulocytes 0.0 <=0.0 10e3/uL    Absolute NRBCs 0.0 10e3/uL             Patient counseling completed today:  Discussed mechanism of action, potential risks/benefits/side effects of medications and other recommendations above.    Recommend pt keep naloxone in their possession and reviewed other aspects of harm reduction to reduce risk of overdose with return to use.   Recommended avoiding concurrent use of alcohol, benzodiazepines or other sedatives with buprenorphine or other opioids.  Discussed importance of avoiding isolation, building a network of supportive relationships, avoiding people/places/things associated with past use to reduce risk of relapse; including motivational interviewing regarding psychosocial treatment for addiction.     At least 30 min spent in review of medical record,  review, obtaining histories, reviewing symptoms, discussing pt's goals for treatment, counseling noted above.    [facility]  [address] [place] [id number] [phone]

## 2021-09-26 LAB
C TRACH DNA SPEC QL NAA+PROBE: NEGATIVE
N GONORRHOEA DNA SPEC QL NAA+PROBE: NEGATIVE

## 2021-09-27 RX ORDER — BUPRENORPHINE AND NALOXONE 8; 2 MG/1; MG/1
1 FILM, SOLUBLE BUCCAL; SUBLINGUAL 3 TIMES DAILY
Qty: 90 FILM | Refills: 0 | Status: SHIPPED | OUTPATIENT
Start: 2021-09-27 | End: 2021-11-08

## 2021-10-08 ENCOUNTER — TELEPHONE (OUTPATIENT)
Dept: BEHAVIORAL HEALTH | Facility: CLINIC | Age: 21
End: 2021-10-08

## 2021-11-08 ENCOUNTER — OFFICE VISIT (OUTPATIENT)
Dept: BEHAVIORAL HEALTH | Facility: CLINIC | Age: 21
End: 2021-11-08
Payer: COMMERCIAL

## 2021-11-08 ENCOUNTER — OFFICE VISIT (OUTPATIENT)
Dept: BEHAVIORAL HEALTH | Facility: CLINIC | Age: 21
End: 2021-11-08

## 2021-11-08 VITALS
DIASTOLIC BLOOD PRESSURE: 67 MMHG | HEART RATE: 74 BPM | SYSTOLIC BLOOD PRESSURE: 104 MMHG | TEMPERATURE: 98.4 F | RESPIRATION RATE: 16 BRPM

## 2021-11-08 DIAGNOSIS — F43.10 PTSD (POST-TRAUMATIC STRESS DISORDER): ICD-10-CM

## 2021-11-08 DIAGNOSIS — F11.93 OPIOID WITHDRAWAL (H): ICD-10-CM

## 2021-11-08 DIAGNOSIS — F43.23 ADJUSTMENT DISORDER WITH MIXED ANXIETY AND DEPRESSED MOOD: Primary | ICD-10-CM

## 2021-11-08 DIAGNOSIS — F11.20 OPIOID USE DISORDER, SEVERE, DEPENDENCE (H): Primary | ICD-10-CM

## 2021-11-08 DIAGNOSIS — F11.20 OPIOID USE DISORDER, SEVERE, DEPENDENCE (H): ICD-10-CM

## 2021-11-08 LAB
FENTANYL UR QL: ABNORMAL
HCG UR QL: NEGATIVE

## 2021-11-08 PROCEDURE — 90832 PSYTX W PT 30 MINUTES: CPT | Performed by: SOCIAL WORKER

## 2021-11-08 PROCEDURE — 99214 OFFICE O/P EST MOD 30 MIN: CPT | Performed by: FAMILY MEDICINE

## 2021-11-08 PROCEDURE — 80307 DRUG TEST PRSMV CHEM ANLYZR: CPT | Performed by: FAMILY MEDICINE

## 2021-11-08 PROCEDURE — 81025 URINE PREGNANCY TEST: CPT | Performed by: FAMILY MEDICINE

## 2021-11-08 RX ORDER — PRAZOSIN HYDROCHLORIDE 1 MG/1
1-3 CAPSULE ORAL AT BEDTIME
Qty: 90 CAPSULE | Refills: 0 | Status: SHIPPED | OUTPATIENT
Start: 2021-11-08

## 2021-11-08 RX ORDER — IBUPROFEN 800 MG/1
800 TABLET, FILM COATED ORAL EVERY 8 HOURS PRN
Qty: 90 TABLET | Refills: 0 | Status: SHIPPED | OUTPATIENT
Start: 2021-11-08

## 2021-11-08 RX ORDER — BUPRENORPHINE AND NALOXONE 8; 2 MG/1; MG/1
1 FILM, SOLUBLE BUCCAL; SUBLINGUAL 3 TIMES DAILY
Qty: 30 FILM | Refills: 0 | Status: SHIPPED | OUTPATIENT
Start: 2021-11-08 | End: 2021-11-24

## 2021-11-08 NOTE — NURSING NOTE
[facility]    Patient presents to the Recovery [facility] for follow up. She relapsed on Fentanyl after finishing her last Suboxone rx.    Requests new rxs for ibuprofen 800mg and prazosin. Was not able to  from pharmacy. Wants to change all rxs to [facility] pharmacy.    Interested in birth control and NRT.    Rooming information:  Approximate last use of illicit opioids: 11/6/21, early AM  Taking buprenorphine? No, ran out    Narcan currently available: Yes  Other recent substance use:    Cannabis  and Cocaine   NICOTINE-Yes: vapes  If using nicotine, ready to quit? Yes, would like like    Point of care urine drug screen positive for: cocaine and THC  *POC urine drug screen does not screen for Fentanyl    Clinical Opioid Withdrawal Scale   COWS Scoring    Resting Pulse Rate  0  =  <=80    Sweating    (over past 1/2 hour) 3  =  beads of sweat on brow or face   Restlessness  3  =  frequent shifting or extraneous movements of legs/arms   Pupil size  0  =  pupils pinned or normal size for room light   Bone or Joint Aches    (acute only) 4  =  patient is rubbing joints or muscles and is unable to sit still because of discomfort   Runny nose or tearing    (unrelated to cold/allergies) 2  =  nose running or tearing   GI Upset    (over past 1/2 hour) 3  =  vomiting or diarrhea   Tremor    (outstretched hands) 2  =  slight tremor observable   Yawning    (during assessment) 2  =  yawning three or more times during assessment   Anxiety/Irritability 2  =  patient obviously irritable or anxious   Gooseflesh skin 3  =  piloerection or skin can be felt or hairs standing up on arms     TOTAL SCORE  Add column for score   24     Pregnancy Status   LMP: 11/4/21  Birth control/barriers: none  Interested in birth control if none currently? Yes  Urine pregnancy test specimen obtained and sent to lab:yes    PHQ-2 Score: 6    PHQ-2 ( 1999 [employer]) 11/8/2021   Q1: Little interest or pleasure in doing things  "3   Q2: Feeling down, depressed or hopeless 3   PHQ-2 Score 6      If PHQ-2 score of 3 or higher, has [facility] therapist or provider been notified? Yes    Any current suicidal ideation? Yes, passive suicidal ideation, no current plan  If yes, has [facility] therapist or provider been notified? Yes    Primary care provider: Physician No Ref-Primary     Mental health provider: none    Insurance needs: active    Housing needs: \"couch hopping,\" homeless    Contact information up to date? No phone right now. Home # on file is lyle's    3rd Party Involvement: DORA signed for Mau alvarenga RN  November 8, 2021  1:12 PM      "

## 2021-11-08 NOTE — PROGRESS NOTES
[facility]   November 8, 2021      Behavioral Health Clinician Progress Note    Patient Name: Russell Lynn           Service Type:  Individual      Service Location:   Face to Face in Clinic     Session Start Time: 1:15pm Session End Time: 1:40pm      Session Length: 16 - 37      Attendees: Patient    Visit Activities (Refresh list every visit): Psychotherapy    Diagnostic Assessment Date: Not completed yet  Treatment Plan Review Date: Not completed yet   See Flowsheets for today's PHQ-9 and ILIANA-7 results  Previous PHQ-9: No flowsheet data found.  Previous ILIANA-7: No flowsheet data found.    MELANIE LEVEL:  No flowsheet data found.    DATA  Extended Session (60+ minutes): No  Interactive Complexity: No  Crisis: No  [facility] Patient: No    Treatment Objective(s) Addressed in This Session:  Target Behavior(s): mental health and addiction    Depressed Mood: Increase interest, engagement, and pleasure in doing things  Decrease frequency and intensity of feeling down, depressed, hopeless  Identify negative self-talk and behaviors: challenge core beliefs, myths, and actions  Improve concentration, focus, and mindfulness in daily activities   Decrease thoughts that you'd be better off dead or of suicide / self-harm  Anxiety: will experience a reduction in anxiety and will develop more effective coping skills to manage anxiety symptoms  Alcohol / Substance Use: will make healthier choices in regard to substance use  will discuss/consider potential need for formal substance use evaluation, treatment and/or support  continue to make healthy choices regarding substance use and engage in activities / supportive services that promote sobriety    Current Stressors / Issues:    The patient stated that she has relapsed and was in active addiction and she was progressing so she came to the clinic to get back on Suboxone to get back sober-she has used opiates and cocaine (to wake up and be  functional)-she has to work tonight and is believing that she needs to go to work even though she has not slept well and will be working a 12 hours shift to make the money-this writer helped the patient problem solve working tonight with pro and cons and the result is the client stated to protect her transition to Suboxone she will call off for work tonight-she talked about having stress in her life and this was feeling overwhelming and the result is that she relapsed on drug use-she stated that she has a history of suicidal thoughts with plans and with being taken to the hospital for admissions due to mental health issues-she stated currently that she is not having suicidal thoughts and she has no intention to harm the self-at points during this visit the client was emotional as we talked about how life has challenges in it and that she has met challenges before that she did not know how to get through it and she was able to get through the challenges-she indorsed having anxiety issues and over thinking about things and trying to figure things out in her thoughts and feeling the pressure of anxiety-this writer named what she is going through as anxiety and the patient stated that over thinking it will not help and this writer attempted to instill hope that there are ways to improve the management of anxiety-the client was hopeful and pessimistic at the same time (amblivalent) but came to the clinic for help so there is a part of her that wants to believe she has a good shot at getting what she wants of sobriety.        Progress on Treatment Objective(s) / Homework:  Minimal progress - ACTION (Actively working towards change); Intervened by reinforcing change plan / affirming steps taken    Motivational Interviewing    MI Intervention: Expressed Empathy/Understanding, Supported Autonomy, Collaboration, Evocation, Permission to raise concern or advise, Open-ended questions, Reflections: simple and complex and Change  talk (evoked)     Change Talk Expressed by the Patient: Desire to change Reasons to change Need to change Committment to change Activation Taking steps    Provider Response to Change Talk: E - Evoked more info from patient about behavior change, A - Affirmed patient's thoughts, decisions, or attempts at behavior change, R - Reflected patient's change talk and S - Summarized patient's change talk statements      Care Plan review completed: No    Medication Review:  No changes to current psychiatric medication(s)    Medication Compliance:  NA    Changes in Health Issues:   None reported    Chemical Use Review:   Substance Use: Chemical use reviewed, no active concerns identified  had recent relapse and is getting back on suboxone today as she is meeting with medical doctor to get prescription.     Tobacco Use: not reported    Assessment: Current Emotional / Mental Status (status of significant symptoms):  Risk status (Self / Other harm or suicidal ideation)  Patient has had a history of suicidal ideation: yes in the past  Patient denies current fears or concerns for personal safety.  Patient denies current or recent suicidal ideation or behaviors.  Patient denies current or recent homicidal ideation or behaviors.  Patient denies current or recent self injurious behavior or ideation.  Patient denies other safety concerns.  A safety and risk management plan has not been developed at this time, however patient was encouraged to call [facility] should there be a change in any of these risk factors.    Appearance:   Appropriate   Eye Contact:   Good   Psychomotor Behavior: Normal   Attitude:   Cooperative   Orientation:   All  Speech   Rate / Production: Normal/ Responsive Normal    Volume:  Normal   Mood:    Anxious  Normal  Affect:    Appropriate   Thought Content:  Clear   Thought Form:  Coherent  Goal Directed  Logical   Insight:    Fair     Diagnoses:  1. Adjustment disorder with mixed anxiety and depressed  mood    2. PTSD (post-traumatic stress disorder)    3. Opioid use disorder, severe, dependence (H)        Collateral Reports Completed:  Not Applicable    Plan: (Homework, other):  Patient was given information about behavioral services and encouraged to schedule a follow up appointment with the clinic [facility] as needed.  She was also given information about mental health symptoms and treatment options  and strategies to maintain sobriety.  CD Recommendations: Maintain Sobriety.   MARIO Mcfadden

## 2021-11-08 NOTE — PROGRESS NOTES
PRC met with pt in the Recovery Clinic to introduce himself and detail services provided in the role.  Pt appeared alert oriented and open to feedback during our meeting.     Pt s tates having no immediate support or recovery needs from PRC.  PRC provided business card welcoming contact from pt for any recovery based needs.  Pt and PRC agreed to speak again during an upcoming RC appointment.     Christopher Olvear on 11/8/2021 at 2:12 PM

## 2021-11-11 ENCOUNTER — TELEPHONE (OUTPATIENT)
Dept: BEHAVIORAL HEALTH | Facility: CLINIC | Age: 21
End: 2021-11-11
Payer: COMMERCIAL

## 2021-11-11 NOTE — TELEPHONE ENCOUNTER
Attempted to reach pt to discuss progress in resuming buprenorphine and other issues from her initial visit.  No answer, left VM requesting call back.

## 2021-11-11 NOTE — PROGRESS NOTES
[facility] - Recovery Clinic Return Visit    ASSESSMENT/PLAN                                                    1. Opioid use disorder, severe, dependence (H)  Pt reporting last use of fentanyl 11/6/21, currently experiencing severe opioid withdrawal symptoms.   Resume buprenorphine starting with 4mg dose, titrate up to previously effective dose 24mg/day  Referral for chemical health assessment, encouraged her to schedule and follow recommendations  - Fentanyl Urine, Qualitative  - HCG qualitative urine  - buprenorphine HCl-naloxone HCl (SUBOXONE) 8-2 MG per film; Place 1 Film under the tongue 3 times daily  Dispense: 30 Film; Refill: 0  - naloxone (NARCAN) 4 MG/0.1ML nasal spray; Spray 1 spray (4 mg) into one nostril alternating nostrils once as needed for opioid reversal every 2-3 minutes until assistance arrives  Dispense: 0.2 mL; Refill: 11  - MENTAL HEALTH REFERRAL  - Adult; Assessments and Testing; MH/CD Assessment Center - Assess & Treat; Chemical Health Evaluation - determine appropriate level of care and admit to program; [facility]: [facility] [phone]; We will contact you to ...; Future    2. Opioid withdrawal (H)  Starting buprenorphine today  - ibuprofen (ADVIL/MOTRIN) 800 MG tablet; Take 1 tablet (800 mg) by mouth every 8 hours as needed for moderate pain  Dispense: 90 tablet; Refill: 0    3. PTSD (post-traumatic stress disorder)  - prazosin (MINIPRESS) 1 MG capsule; Take 1-3 capsules (1-3 mg) by mouth At Bedtime As needed for nightmares  Dispense: 90 capsule; Refill: 0         Return for 7-10 days, Follow up, with me, in person.        SUBJECTIVE                                                      CC/HPI:  Russell Lynn is a 21 year old   female with PMH anxiety, depression, PTSD, and opioid use disorder on buprenorphine who presents to the Recovery Clinic for return visit.      Brief History:  Pt was first evaluated in Recovery Clinic on 9/17/21.  At that time she reported ~3 yr  h/o intranasal fentanyl use.  She started buprenorphine through Recovery Clinic on 9/17/2.    Substance Use History :  Opioids: First use: 11/2018    Most recent use: substance: perc 30/fentanyl quantity 4-5 pills daily route: snorting timing of last use: 9/12/2021     IV drug use: No   History of overdose: Yes: 5/2020  Previous treatments : Yes: methadone x 1 1/2 yrs through [facility]; stopped methadone abruptly ~5/2021  Longest period of sobriety: 8 months  Medical complications related to substance use: denies  Hepatitis C Status  9/24/21 HCV ab nonreactive  HIV Status 9/24/21 HIV ag/ab nonreactive    Other recent substance use:  Stimulants: last used meth 2 weeks ago  Sedatives/hypnotics/anxiolytics:last used a year ago  Alcohol:denies  Tobacco:currently  Cannabis:currently  Hallucinogens:last used 2-4 weeks ago  Behavioral addictions: jose      Pt was last seen on 9/24/21.   A/P at that time was:  1. Opioid use disorder, severe, dependence (H)  Pt tolerated initiation of buprenorphine, reporting control of symptoms with 24mg/day  Continue buprenorphine, prescribing at increased dose f 24mg/day  Pt agreeable to individual therapy with Grady CHACON at her next appointment  - CBC with Platelets & Differential; Future  - COMPREHENSIVE METABOLIC PANEL; Future  - Hepatitis B core antibody; Future  - Hepatitis B Surface Antibody; Future  - Hepatitis B surface antigen; Future  - Hepatitis C Screen Reflex to HCV RNA Quant and Genotype; Future  - HIV Antigen Antibody Combo; Future  - buprenorphine HCl-naloxone HCl (SUBOXONE) 8-2 MG per film; Place 1 Film under the tongue 3 times daily  Dispense: 90 Film; Refill: 0  - naloxone (NARCAN) 4 MG/0.1ML nasal spray; Spray 1 spray (4 mg) into one nostril alternating nostrils once as needed for opioid reversal every 2-3 minutes until assistance arrives  Dispense: 0.2 mL; Refill: 11  - CBC with Platelets & Differential  - COMPREHENSIVE METABOLIC PANEL  - Hepatitis B core antibody  -  Hepatitis B Surface Antibody  - Hepatitis B surface antigen  - Hepatitis C Screen Reflex to HCV RNA Quant and Genotype  - HIV Antigen Antibody Combo    2. Tobacco use disorder  Not ready to quit at this time    3. Screen for STD (sexually transmitted disease)  - NEISSERIA GONORRHOEA PCR  - CHLAMYDIA TRACHOMATIS PCR  - Hepatitis B Surface Antibody; Future  - Hepatitis B surface antigen; Future  - Hepatitis C Screen Reflex to HCV RNA Quant and Genotype; Future  - HIV Antigen Antibody Combo; Future  - Treponema Abs w Reflex to RPR and Titer; Future  - Hepatitis B Surface Antibody  - Hepatitis B surface antigen  - Hepatitis C Screen Reflex to HCV RNA Quant and Genotype  - HIV Antigen Antibody Combo  - Treponema Abs w Reflex to RPR and Titer    4. PTSD (post-traumatic stress disorder)  Pt describing symptoms consistent with nightmares in setting of PTSD.  Recalls previous experience with prazosin which she tolerated.   rx for prazosin as noted  - prazosin (MINIPRESS) 1 MG capsule; Take 1-3 capsules (1-3 mg) by mouth At Bedtime As needed for nightmares  Dispense: 90 capsule; Refill: 1      Pt did not return for 10/8/21 follow up appointment.     Today, pt states she remained on buprenorphine for one month at 24mg/day, then returned to use of fentanyl, last use 11/6/21.  Currently experiencing significant withdrawal symptoms as noted in nursing documentation. She would like to resume buprenorphine treatment, and she is open to psychosocial treatment for addiction.      Pt reported to RN she was not able to  rx for ibuprofen or prazosin after her last visit, and is interested in obtaining new rx's.        [place] Prescription Drug Monitoring Program Reviewed:  Yes; as expected          No past medical history on file.        PAST PSYCHIATRIC HISTORY:  Diagnoses- PTSD, depression and anxiety   Suicide Attempts: Yes 2020  Hospitalizations: Yes 2020     No past surgical history on file.      Medications:  No current  outpatient medications on file prior to visit.  No current facility-administered medications on file prior to visit.           No Known Allergies      No family history on file.        Social History  Housing status: no permanent address  Employment status: Unemployed, not seeking work  Relationship status: has a boyfriend  Children: 1 child          REVIEW OF SYSTEMS:  No other concerns      OBJECTIVE                                                        /67   Pulse 74   Temp 98.4  F (36.9  C)   Resp 16   LMP 11/04/2021     Physical Exam  Constitutional:       Comments: Appears uncomfortable related to withdrawal   HENT:      Head: Normocephalic and atraumatic.   Eyes:      General: No scleral icterus.     Extraocular Movements: Extraocular movements intact.      Conjunctiva/sclera: Conjunctivae normal.   Pulmonary:      Effort: Pulmonary effort is normal.   Neurological:      Mental Status: She is alert and oriented to person, place, and time.      Coordination: Coordination is intact.      Gait: Gait is intact.   Psychiatric:         Attention and Perception: Attention and perception normal.         Mood and Affect: Mood is anxious and depressed. Affect is not inappropriate.         Speech: Speech normal.         Behavior: Behavior is cooperative.         Thought Content: Thought content normal.      Comments: Insight and judgement are fair to good           Labs:    UDS: cocaine and THC  *POC urine drug screen does not screen for Fentanyl    Recent Results (from the past 240 hour(s))   Fentanyl Urine, Qualitative    Collection Time: 11/08/21  1:30 PM   Result Value Ref Range    Fentanyl Qual Urine Screen Positive (A) Screen Negative   HCG qualitative urine    Collection Time: 11/08/21  1:30 PM   Result Value Ref Range    hCG Urine Qualitative Negative Negative             Patient counseling completed today:  Discussed mechanism of action, potential risks/benefits/side effects of medications and other  recommendations above.    Recommend pt keep naloxone in their possession and reviewed other aspects of harm reduction to reduce risk of overdose with return to use.   Recommended avoiding concurrent use of alcohol, benzodiazepines or other sedatives with buprenorphine or other opioids.  Discussed importance of avoiding isolation, building a network of supportive relationships, avoiding people/places/things associated with past use to reduce risk of relapse; including motivational interviewing regarding psychosocial treatment for addiction.     At least 30 min spent in review of medical record,  review, obtaining histories, reviewing symptoms, discussing pt's goals for treatment, counseling noted above.      Mercedes Howell MD  [facility]  [address] [place] [id number] [phone]

## 2021-11-15 ENCOUNTER — TELEPHONE (OUTPATIENT)
Dept: BEHAVIORAL HEALTH | Facility: CLINIC | Age: 21
End: 2021-11-15

## 2021-11-24 ENCOUNTER — OFFICE VISIT (OUTPATIENT)
Dept: BEHAVIORAL HEALTH | Facility: CLINIC | Age: 21
End: 2021-11-24
Payer: COMMERCIAL

## 2021-11-24 ENCOUNTER — OFFICE VISIT (OUTPATIENT)
Dept: BEHAVIORAL HEALTH | Facility: CLINIC | Age: 21
End: 2021-11-24

## 2021-11-24 ENCOUNTER — APPOINTMENT (OUTPATIENT)
Dept: BEHAVIORAL HEALTH | Facility: CLINIC | Age: 21
End: 2021-11-24
Payer: COMMERCIAL

## 2021-11-24 VITALS — HEART RATE: 92 BPM | DIASTOLIC BLOOD PRESSURE: 75 MMHG | SYSTOLIC BLOOD PRESSURE: 122 MMHG

## 2021-11-24 DIAGNOSIS — F11.20 OPIOID USE DISORDER, SEVERE, DEPENDENCE (H): Primary | ICD-10-CM

## 2021-11-24 DIAGNOSIS — F43.10 PTSD (POST-TRAUMATIC STRESS DISORDER): ICD-10-CM

## 2021-11-24 DIAGNOSIS — F17.200 TOBACCO USE DISORDER: ICD-10-CM

## 2021-11-24 LAB
FENTANYL UR QL: ABNORMAL
HCG UR QL: NEGATIVE

## 2021-11-24 PROCEDURE — 80307 DRUG TEST PRSMV CHEM ANLYZR: CPT | Performed by: FAMILY MEDICINE

## 2021-11-24 PROCEDURE — 2894A VOIDCORRECT: CPT | Mod: 25

## 2021-11-24 PROCEDURE — 81025 URINE PREGNANCY TEST: CPT | Performed by: FAMILY MEDICINE

## 2021-11-24 PROCEDURE — 99214 OFFICE O/P EST MOD 30 MIN: CPT | Performed by: FAMILY MEDICINE

## 2021-11-24 RX ORDER — BUPRENORPHINE AND NALOXONE 8; 2 MG/1; MG/1
1 FILM, SOLUBLE BUCCAL; SUBLINGUAL 3 TIMES DAILY
Qty: 30 FILM | Refills: 0 | Status: SHIPPED | OUTPATIENT
Start: 2021-11-24 | End: 2022-01-04

## 2021-11-24 NOTE — PROGRESS NOTES
PRC met with pt in the Recovery Clinic to discuss recovery status.   Pt appeared somewhat compromised in alertness, orientation and communication.  Pt states she is extremely tired due to a lack of sleep relating to family issues.     Pt states suboxone program is working well and she remains free of opioid use since her last visit. Pt has no specific support or resource needs from [facility] at this time. Agreement is to meet again for recovery status discussion during an upcoming RC appointment.     Christopher Olvera on 11/24/2021 at 10:16 AM

## 2021-11-24 NOTE — PROGRESS NOTES
[facility] Return Visit    ASSESSMENT/PLAN                                                    1. Opioid use disorder, severe, dependence (H)  Pt reporting control of symptoms with prescribed use, endorses one episode of use when ran out of meds due to delay in follow up.   Resume buprenorphine 24mg/day  Pt did not schedule chemical health assessment as discussed previous visit, follow-up pt's goals next visit  Pt confirmed she has naloxone  Pt confirmed she has medical cab driving her home today  - buprenorphine HCl-naloxone HCl (SUBOXONE) 8-2 MG per film; Place 1 Film under the tongue 3 times daily  Dispense: 30 Film; Refill: 0  - HCG qualitative urine  - Fentanyl Urine, Qualitative    2. Tobacco use disorder  Pt expressing interest in quitting, discuss NRT and bupropion next visit     3. PTSD (post-traumatic stress disorder)  Reevaluate efficacy/use of prazosin next visit    4. Stimulant use  Evaluate extent of stimulant use further next visit    Return in 1 week (on 12/1/2021) for Follow up, in person.        SUBJECTIVE                                                      CC/HPI:  Russell Lynn is a 21 year old female with PMH anxiety, depression, PTSD, and opioid use disorder on buprenorphine who presents to the Recovery Clinic for return visit.      Brief History:  Pt was first evaluated in Recovery Clinic on 9/17/21.  At that time she reported ~3 yr h/o intranasal fentanyl use.  She started buprenorphine through Recovery Clinic on 9/17/21.      Substance Use History :  Opioids: First use: 11/2018    Most recent use: substance: perc 30/fentanyl quantity 4-5 pills daily route: snorting      IV drug use: No   History of overdose: Yes: 5/2020  Previous treatments : Yes: methadone x 1 1/2 yrs through [facility]; stopped methadone abruptly ~5/2021  Longest period of sobriety: 8 months  Medical complications related to substance use: denies  Hepatitis C Status  9/24/21 HCV ab  nonreactive  HIV Status 9/24/21 HIV ag/ab nonreactive    Other recent substance use:  Stimulants: occasional use of methamphetamine  Sedatives/hypnotics/anxiolytics:last used a year ago  Alcohol:denies  Tobacco:currently  Cannabis:currently  Hallucinogens:last used 2-4 weeks ago  Behavioral addictions: [place]      Pt was last seen on 11/8/21.   A/P at that time was:  1. Opioid use disorder, severe, dependence (H)  Pt reporting last use of fentanyl 11/6/21, currently experiencing severe opioid withdrawal symptoms.   Resume buprenorphine starting with 4mg dose, titrate up to previously effective dose 24mg/day  Referral for chemical health assessment, encouraged her to schedule and follow recommendations  - Fentanyl Urine, Qualitative  - HCG qualitative urine  - buprenorphine HCl-naloxone HCl (SUBOXONE) 8-2 MG per film; Place 1 Film under the tongue 3 times daily  Dispense: 30 Film; Refill: 0  - naloxone (NARCAN) 4 MG/0.1ML nasal spray; Spray 1 spray (4 mg) into one nostril alternating nostrils once as needed for opioid reversal every 2-3 minutes until assistance arrives  Dispense: 0.2 mL; Refill: 11  - MENTAL HEALTH REFERRAL  - Adult; Assessments and Testing; [facility] - Assess & Treat; Chemical Health Evaluation - determine appropriate level of care and admit to program; [facility]: [facility] [phone]; We will contact you to ...; Future    2. Opioid withdrawal (H)  Starting buprenorphine today  - ibuprofen (ADVIL/MOTRIN) 800 MG tablet; Take 1 tablet (800 mg) by mouth every 8 hours as needed for moderate pain  Dispense: 90 tablet; Refill: 0    3. PTSD (post-traumatic stress disorder)  - prazosin (MINIPRESS) 1 MG capsule; Take 1-3 capsules (1-3 mg) by mouth At Bedtime As needed for nightmares  Dispense: 90 capsule; Refill: 0            Today, pt states she was able to resume buprenorphine and took 24mg/day until rx was gone, then did use fentanyl on 11/19-20.   She then states she had 1/2 film  remaining which she took on her way to this visit today.  No precipitated withdrawal noted after taking buprenorphine this morning.  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  She states she also smoked cannabis just prior to this appointment.       [place] Prescription Drug Monitoring Program Reviewed:  Yes; as expected          No past medical history on file.        PAST PSYCHIATRIC HISTORY:  Diagnoses- PTSD, depression and anxiety   Suicide Attempts: Yes 2020  Hospitalizations: Yes 2020     No past surgical history on file.      Medications:  buprenorphine HCl-naloxone HCl (SUBOXONE) 8-2 MG per film, Place 1 Film under the tongue 3 times daily  ibuprofen (ADVIL/MOTRIN) 800 MG tablet, Take 1 tablet (800 mg) by mouth every 8 hours as needed for moderate pain  prazosin (MINIPRESS) 1 MG capsule, Take 1-3 capsules (1-3 mg) by mouth At Bedtime As needed for nightmares  naloxone (NARCAN) 4 MG/0.1ML nasal spray, Spray 1 spray (4 mg) into one nostril alternating nostrils once as needed for opioid reversal every 2-3 minutes until assistance arrives (Patient not taking: Reported on 11/24/2021)    No current facility-administered medications on file prior to visit.           No Known Allergies      No family history on file.        Social History  Housing status: no permanent address; staying with friends  Employment status: Unemployed, not seeking work  Relationship status: has a boyfriend  Children: 1 child          REVIEW OF SYSTEMS:  No other concerns      OBJECTIVE                                                        /75   Pulse 92   LMP 11/23/2021     Physical Exam  Constitutional:       Comments: Appears sleepy, occasionally closing eyes for 1-2 seconds at a time   HENT:      Head: Normocephalic and atraumatic.   Eyes:      General: No scleral icterus.     Extraocular Movements:  Extraocular movements intact.      Conjunctiva/sclera: Conjunctivae normal.   Pulmonary:      Effort: Pulmonary effort is normal.   Neurological:      Mental Status: She is oriented to person, place, and time.      Coordination: Coordination is intact.      Gait: Gait is intact.   Psychiatric:         Attention and Perception: Attention and perception normal.         Mood and Affect: Mood is anxious and depressed. Affect is not inappropriate.         Behavior: Behavior is cooperative.         Thought Content: Thought content normal.      Comments: Insight and judgement are fair to good  Speech is at times slurred and trailing off           Labs:    UDS: +THC, +amphetamine, +methamphetamine, +buprenorphine (pt did not disclose methamphetamine use; she did not leave urine specimen until after visit)  *POC urine drug screen does not screen for Fentanyl    No results found for this or any previous visit (from the past 240 hour(s)).          Patient counseling completed today:  Discussed mechanism of action, potential risks/benefits/side effects of medications and other recommendations above.    Recommend pt keep naloxone in their possession and reviewed other aspects of harm reduction to reduce risk of overdose with return to use.   Recommended avoiding concurrent use of alcohol, benzodiazepines or other sedatives with buprenorphine or other opioids.  Discussed importance of avoiding isolation, building a network of supportive relationships, avoiding people/places/things associated with past use to reduce risk of relapse; including motivational interviewing regarding psychosocial treatment for addiction.     At least 30 min spent in review of medical record,  review, obtaining histories, reviewing symptoms, discussing pt's goals for treatment, counseling noted above.      Mercedes Howell MD  [facility]  [address] [place] [id number] [phone]

## 2021-11-24 NOTE — NURSING NOTE
[facility]      Rooming information:  Approximate last use of illicit opioids: 11/20/2021  Taking buprenorphine? Yes:  As prescribed? Yes:   Number of buprenorphine films/tablets remaining currently: 0  Side effects related to buprenorphine (constipation, dry mouth, sedation?) Yes: dry mouth  Narcan currently available: Yes  Other recent substance use:    Cannabis   NICOTINE-Yes:  If using nicotine, ready to quit? Yes:     Point of care urine drug screen positive for: amphetamines, buprenorphine, methamphetamines and THC  *POC urine drug screen does not screen for Fentanyl    Pregnancy Status   LMP: 11/23/2021  Birth control/barriers: denies  Interested in birth control if none currently? Yes:   Urine pregnancy test specimen obtained and sent to lab:yes    PHQ-2 Score:     PHQ-2 ( 1999 [employer]) 11/24/2021 11/8/2021   Q1: Little interest or pleasure in doing things 3 3   Q2: Feeling down, depressed or hopeless 2 3   PHQ-2 Score 5 6        If PHQ-2 score of 3 or higher, has Recovery Clinic therapist or provider been notified? Yes    Any current suicidal ideation? No  If yes, has Recovery Clinic therapist or provider been notified? N/A    Primary care provider: Physician No Ref-Primary     Mental health provider: denies (follow up on MH referral if needed)    Insurance needs: active    Housing needs: homeless     Contact information up to date? yes    3rd Party Involvement none today (please obtain DORA if pt would like to include)    Marie Lau CMA  November 24, 2021  9:07 AM

## 2021-12-01 ENCOUNTER — TELEPHONE (OUTPATIENT)
Dept: BEHAVIORAL HEALTH | Facility: CLINIC | Age: 21
End: 2021-12-01
Payer: COMMERCIAL

## 2021-12-23 NOTE — PROGRESS NOTES
Please disregard this chart entry - it is a duplicate for patient visit on this date of service.   Patient was seen on this date.  Progress notes for the visit are reflected in a separate chart entry on this date.     Christopher Olvera on 12/23/2021 at 12:07 PM

## 2022-01-04 ENCOUNTER — OFFICE VISIT (OUTPATIENT)
Dept: BEHAVIORAL HEALTH | Facility: CLINIC | Age: 22
End: 2022-01-04
Payer: COMMERCIAL

## 2022-01-04 ENCOUNTER — TELEPHONE (OUTPATIENT)
Dept: BEHAVIORAL HEALTH | Facility: CLINIC | Age: 22
End: 2022-01-04

## 2022-01-04 VITALS — SYSTOLIC BLOOD PRESSURE: 97 MMHG | DIASTOLIC BLOOD PRESSURE: 58 MMHG | HEART RATE: 59 BPM

## 2022-01-04 DIAGNOSIS — F17.200 TOBACCO USE DISORDER: ICD-10-CM

## 2022-01-04 DIAGNOSIS — F11.93 OPIOID WITHDRAWAL (H): ICD-10-CM

## 2022-01-04 DIAGNOSIS — F11.20 OPIOID USE DISORDER, SEVERE, DEPENDENCE (H): Primary | ICD-10-CM

## 2022-01-04 LAB — HCG UR QL: NEGATIVE

## 2022-01-04 PROCEDURE — 81025 URINE PREGNANCY TEST: CPT | Performed by: FAMILY MEDICINE

## 2022-01-04 PROCEDURE — 99214 OFFICE O/P EST MOD 30 MIN: CPT | Performed by: FAMILY MEDICINE

## 2022-01-04 RX ORDER — HYDROXYZINE HYDROCHLORIDE 50 MG/1
50 TABLET, FILM COATED ORAL EVERY 6 HOURS PRN
Qty: 40 TABLET | Refills: 0 | Status: SHIPPED | OUTPATIENT
Start: 2022-01-04

## 2022-01-04 RX ORDER — BUPRENORPHINE AND NALOXONE 8; 2 MG/1; MG/1
FILM, SOLUBLE BUCCAL; SUBLINGUAL
Qty: 20 FILM | Refills: 0 | Status: SHIPPED | OUTPATIENT
Start: 2022-01-04 | End: 2022-03-02

## 2022-01-04 RX ORDER — GABAPENTIN 300 MG/1
300 CAPSULE ORAL EVERY 6 HOURS PRN
Qty: 20 CAPSULE | Refills: 0 | Status: SHIPPED | OUTPATIENT
Start: 2022-01-04 | End: 2022-01-27

## 2022-01-04 RX ORDER — ONDANSETRON 8 MG/1
4-8 TABLET, FILM COATED ORAL EVERY 8 HOURS PRN
Qty: 15 TABLET | Refills: 0 | Status: SHIPPED | OUTPATIENT
Start: 2022-01-04

## 2022-01-04 RX ORDER — CLONIDINE HYDROCHLORIDE 0.1 MG/1
0.1 TABLET ORAL EVERY 6 HOURS PRN
Qty: 20 TABLET | Refills: 0 | Status: SHIPPED | OUTPATIENT
Start: 2022-01-04 | End: 2022-01-27

## 2022-01-04 NOTE — PROGRESS NOTES
[facility]    Peer  met with Russell Lynn in the Recovery Clinic to introduce himself, detail services provided and discuss current status of recovery. Pt appeared alert, oriented and open to feedback during our discussion.     Pt was experiencing precipated withdrawal upon arriving at the .  Per pt request, [facility] provided shelter options such as [facility], [facility], and [employer] resoruces for shelter connections. [facility] also provided [facility] ([facility]) as a treatment option.     [facility] provided pt s/o (DORA is completed) with [facility] Assessment number [phone] to complete a Rule 25 for possibility of admission into the [facility] program.     Patient Goal: To utilize suboxone assisted treatment for sobriety and long term recovery.     Goal Progress:   Ongoing.    Key Risk Factors to Recovery:   PRC encourages being aware of risk factors that can lead to re-use which include avoiding isolation, avoiding triggers and managing cravings in a healthy manner. being open and willing to acceptance and change on a daily basis.  PRC encourages pt to establish a sober network calling tree to reach out to when needed.  Continue to practice honesty with ourselves and trusted support person(s).   [facility] encourages regular attendance at recovery based meetings as well as finding a sponsor for mentoring and accountability.   PRC encourages consideration of of other services such as counseling for mental health issues which can correlate with our substance use.      Support Needs:   Ongoing care, support and resources for opioid substance use disorder.     Follow up:   PRC provided pt with his contact information for support and resource needs.  PRC and pt agree to meet during an upcoming  appointment.       [facility]  [address], [address]   [place], [place], [address]  Clinic Phone: [phone]  Clinic Fax: [phone]  [facility]   phone: [phone]    Open Monday - Friday  9:00am-4:00pm  Walk in hours: 9am-3pm      Christopher Olvera  January 4, 2022  1:18 PM

## 2022-01-04 NOTE — PATIENT INSTRUCTIONS
To treat symptoms of withdrawal now, take prescribed clonidine, gabapentin, hydroxyzine, and odansetron as prescribed.     Wait until at least 6pm on 1/4/22, or at least 24-36 hours from your last use of fentanyl, before starting buprenorphine (Suboxone.)  Follow directions on Suboxone prescription to start when you have reached this point.

## 2022-01-04 NOTE — PROGRESS NOTES
[facility] Return Visit    ASSESSMENT/PLAN                                                    1. Opioid use disorder, severe, dependence (H)  Pt reporting last use of fentanyl 1/3/21 1600.   Experiencing precipitated withdrawal after taking buprenorphine 16mg in AM of 1/4/22.    Discussed meds below to mitigate withdrawal symptoms  Reviewed instructions for starting buprenorphine when >24hrs from last use of fentanyl  Pt was given resources for housing and met with   Pt did schedule chemical health assessment for 1/6/22, virtual appointment  - HCG qualitative urine  - buprenorphine HCl-naloxone HCl (SUBOXONE) 8-2 MG per film; Take 1/4 film when at least 24hrs from last use; repeat dose q1-2 hours max 2 films day one; day two start 1 sl bid  Dispense: 20 Film; Refill: 0  - naloxone (NARCAN) 4 MG/0.1ML nasal spray; Spray 1 spray (4 mg) into one nostril alternating nostrils once as needed for opioid reversal every 2-3 minutes until assistance arrives  Dispense: 0.2 mL; Refill: 11    2. Opioid withdrawal (H)  Pt experiencing precipitated withdrawal after taking buprenorphine 16mg ~12 hrs from last use of fentanyl  Reviewed use of medications below to start now  - cloNIDine (CATAPRES) 0.1 MG tablet; Take 1 tablet (0.1 mg) by mouth every 6 hours as needed (withdrawal symptoms including hot/cold sweats, anxiety, restlessness, sleeplessness)  Dispense: 20 tablet; Refill: 0  - gabapentin (NEURONTIN) 300 MG capsule; Take 1 capsule (300 mg) by mouth every 6 hours as needed (withdrawal symptoms including anxiety, restlessness, sleeplessness)  Dispense: 20 capsule; Refill: 0  - ondansetron (ZOFRAN) 8 MG tablet; Take 0.5-1 tablets (4-8 mg) by mouth every 8 hours as needed for nausea  Dispense: 15 tablet; Refill: 0  - hydrOXYzine (ATARAX) 50 MG tablet; Take 1 tablet (50 mg) by mouth every 6 hours as needed (withdrawal symptoms including runny nose, watery eyes, anxiety,  sleeplessness)  Dispense: 40 tablet; Refill: 0    3. Tobacco use disorder  Not ready to quit at this time    Return for Follow up, in person in 7-10 days.        SUBJECTIVE                                                      CC/HPI:  Russell Lynn is a 21 year old female with PMH anxiety, depression, PTSD, and opioid use disorder who presents to the Recovery Clinic for return visit.      Brief History:  Pt was first evaluated in Recovery Clinic on 9/17/21.  At that time she reported ~3 yr h/o intranasal fentanyl use.  She started buprenorphine through Recovery Clinic on 9/17/21. She was lost to follow up 9/17-11/8, and again 11/8-1/4/21.      Substance Use History :  Opioids: First use: 11/2018    Most recent use: substance: perc 30/fentanyl quantity 4-5 pills daily route:intranasal     IV drug use: No   History of overdose: Yes: 5/2020  Previous treatments : Yes: methadone x 1 1/2 yrs through [facility]; stopped methadone abruptly ~5/2021  Longest period of sobriety: 8 months  Medical complications related to substance use: denies  Hepatitis C Status  9/24/21 HCV ab nonreactive  HIV Status 9/24/21 HIV ag/ab nonreactive    Other recent substance use:  Stimulants: occasional use of methamphetamine  Sedatives/hypnotics/anxiolytics:last used a year ago  Alcohol:denies  Tobacco:currently  Cannabis:currently  Hallucinogens:last used 2-4 weeks ago  Behavioral addictions: denies      Pt was last seen on 11/24/21.   A/P at that time was:  1. Opioid use disorder, severe, dependence (H)  Pt reporting control of symptoms with prescribed use, endorses one episode of use when ran out of meds due to delay in follow up.   Resume buprenorphine 24mg/day  Pt did not schedule chemical health assessment as discussed previous visit, follow-up pt's goals next visit  Pt confirmed she has naloxone  Pt confirmed she has medical cab driving her home today  - buprenorphine HCl-naloxone HCl (SUBOXONE) 8-2 MG per film; Place 1 Film under  the tongue 3 times daily  Dispense: 30 Film; Refill: 0  - HCG qualitative urine  - Fentanyl Urine, Qualitative    2. Tobacco use disorder  Pt expressing interest in quitting, discuss NRT and bupropion next visit     3. PTSD (post-traumatic stress disorder)  Reevaluate efficacy/use of prazosin next visit    4. Stimulant use  Evaluate extent of stimulant use further next visit            Today, pt states she has been using fentanyl daily for about the last month, last use 1/3/22 1600.  She goes on to say she took 16mg buprenorphine early in AM of 1/4/22 and developed severe opioid withdrawal symptoms.  She is currently experiencing back pain, leg pain, abdominal cramping, hot/cold sweats, restlessness, anxiety, insomnia.  She denies any other opioid use, denies use of other substances besides cannabis and nicotine.       [place] Prescription Drug Monitoring Program Reviewed:  Yes; as expected          No past medical history on file.        PAST PSYCHIATRIC HISTORY:  Diagnoses- PTSD, depression and anxiety   Suicide Attempts: Yes 2020  Hospitalizations: Yes 2020     No past surgical history on file.      Medications:  buprenorphine HCl-naloxone HCl (SUBOXONE) 8-2 MG per film, Place 1 Film under the tongue 3 times daily  ibuprofen (ADVIL/MOTRIN) 800 MG tablet, Take 1 tablet (800 mg) by mouth every 8 hours as needed for moderate pain (Patient not taking: Reported on 1/4/2022)  naloxone (NARCAN) 4 MG/0.1ML nasal spray, Spray 1 spray (4 mg) into one nostril alternating nostrils once as needed for opioid reversal every 2-3 minutes until assistance arrives (Patient not taking: Reported on 11/24/2021)  prazosin (MINIPRESS) 1 MG capsule, Take 1-3 capsules (1-3 mg) by mouth At Bedtime As needed for nightmares (Patient not taking: Reported on 1/4/2022)    No current facility-administered medications on file prior to visit.           No Known Allergies      No family history on file.        Social History  Housing status: no  permanent address; staying with friends  Employment status: Unemployed, not seeking work  Relationship status: has a boyfriend  Children: 1 child          REVIEW OF SYSTEMS:  No other concerns      OBJECTIVE                                                        BP 97/58   Pulse 59   LMP  (LMP Unknown)     Physical Exam  Constitutional:       General: She is awake.      Comments: Lying on her side on exam table as I enter room, remains facing away from me during visit until asked to turn over, appears uncomfortable   HENT:      Head: Normocephalic and atraumatic.   Eyes:      General: No scleral icterus.     Extraocular Movements: Extraocular movements intact.      Conjunctiva/sclera: Conjunctivae normal.   Pulmonary:      Effort: Pulmonary effort is normal.   Neurological:      Mental Status: She is oriented to person, place, and time.      Coordination: Coordination is intact.      Gait: Gait is intact.   Psychiatric:         Attention and Perception: Attention and perception normal.         Mood and Affect: Mood is anxious and depressed. Affect is flat.         Behavior: Behavior is cooperative.         Thought Content: Thought content normal.      Comments: Insight and judgement are fair              Labs:    UDS: +THC, +buprenorphine   *POC urine drug screen does not screen for Fentanyl    Recent Results (from the past 240 hour(s))   HCG qualitative urine    Collection Time: 01/04/22 11:55 AM   Result Value Ref Range    hCG Urine Qualitative Negative Negative             Patient counseling completed today:  Discussed mechanism of action, potential risks/benefits/side effects of medications and other recommendations above.    Recommend pt keep naloxone in their possession and reviewed other aspects of harm reduction to reduce risk of overdose with return to use.   Recommended avoiding concurrent use of alcohol, benzodiazepines or other sedatives with buprenorphine or other opioids.  Discussed importance of  avoiding isolation, building a network of supportive relationships, avoiding people/places/things associated with past use to reduce risk of relapse; including motivational interviewing regarding psychosocial treatment for addiction.     At least 30 min spent in review of medical record,  review, obtaining histories, reviewing symptoms, discussing pt's goals for treatment, counseling noted above.      Mercedes Howell MD  [facility]  [address] [place] [id number] [phone]

## 2022-01-06 NOTE — TELEPHONE ENCOUNTER
Patient have a virtual video appointment today with [facility] at 10:30am. Writer as informed from patient's provider around 10:40am that patient is not present for appt. Writer reached out to patient at 10:45am to check on patient. Unable to get a hold of patient. Patient's mobile phone is busy, writer could not leave a voicemail behind for patient. Writer will inform patient's provider.

## 2022-01-06 NOTE — TELEPHONE ENCOUNTER
[facility] checked pt in for her 10:30 Josefina Assessment.  This  invited the pt for the video visit at 10:30 via sending an e-mail and a text.  Pt did not show on video.  This W called her cell number and there was no answer.  It went to a busy signal.  This W attempted again with an e-mail and a text invitation to no avail.  Also, tried to call again with the same above results.  Pt is a no show for today's appointment and can call Intake if she would like to reschedule.

## 2022-01-07 ENCOUNTER — TELEPHONE (OUTPATIENT)
Dept: BEHAVIORAL HEALTH | Facility: CLINIC | Age: 22
End: 2022-01-07
Payer: COMMERCIAL

## 2022-01-11 NOTE — TELEPHONE ENCOUNTER
Patient have a virtual video appointment today with [facility] at 10:30am. Writer placed a phone call today at 9:56am to check in. Unable to get a hold of patient at home phone. Pt's home phone says: Call can not be completed at this time. Please hang up and try again later. Writer tried to call patient's mobile number, but patient's mobile number is busy. Writer could not get a hold of patient. Writer will try to call patient again to check in for appt today.

## 2022-01-11 NOTE — TELEPHONE ENCOUNTER
Writer placed a second phone call today at 10:15am to check in patient. Unable to reach patient by phone. Writer could not leave a voicemail behind. Patient's mobile phone have a busy line. Writer will inform patient's provider.

## 2022-01-27 ENCOUNTER — HOSPITAL ENCOUNTER (EMERGENCY)
Facility: CLINIC | Age: 22
Discharge: HOME | End: 2022-01-27
Attending: INTERNAL MEDICINE | Admitting: INTERNAL MEDICINE
Payer: COMMERCIAL

## 2022-01-27 VITALS
HEART RATE: 97 BPM | OXYGEN SATURATION: 99 % | TEMPERATURE: 98.4 F | SYSTOLIC BLOOD PRESSURE: 104 MMHG | DIASTOLIC BLOOD PRESSURE: 70 MMHG | WEIGHT: 125 LBS | BODY MASS INDEX: 20.18 KG/M2 | RESPIRATION RATE: 18 BRPM

## 2022-01-27 DIAGNOSIS — F11.90 OPIOID USE DISORDER: ICD-10-CM

## 2022-01-27 PROCEDURE — 99283 EMERGENCY DEPT VISIT LOW MDM: CPT | Performed by: INTERNAL MEDICINE

## 2022-01-27 RX ORDER — QUETIAPINE FUMARATE 25 MG/1
25-50 TABLET, FILM COATED ORAL
Qty: 20 TABLET | Refills: 0 | Status: SHIPPED | OUTPATIENT
Start: 2022-01-27

## 2022-01-27 RX ORDER — CLONIDINE HYDROCHLORIDE 0.1 MG/1
0.1 TABLET ORAL EVERY 6 HOURS PRN
Qty: 20 TABLET | Refills: 0 | Status: SHIPPED | OUTPATIENT
Start: 2022-01-27

## 2022-01-27 RX ORDER — GABAPENTIN 300 MG/1
300 CAPSULE ORAL EVERY 6 HOURS PRN
Qty: 20 CAPSULE | Refills: 0 | Status: SHIPPED | OUTPATIENT
Start: 2022-01-27 | End: 2022-03-02 | Stop reason: DRUGHIGH

## 2022-01-27 ASSESSMENT — ENCOUNTER SYMPTOMS
CONFUSION: 0
ABDOMINAL PAIN: 0
ARTHRALGIAS: 0
EYE REDNESS: 0
SHORTNESS OF BREATH: 0
FEVER: 0
COLOR CHANGE: 0
DIFFICULTY URINATING: 0
HEADACHES: 0
NECK STIFFNESS: 0

## 2022-01-27 NOTE — DISCHARGE INSTRUCTIONS
Please follow up with Rule 25 evaluation for Chemical Dependency Program like [facility] as soon as possible even if entirely better.

## 2022-01-27 NOTE — ED PROVIDER NOTES
[facility] [place] EMERGENCY DEPARTMENT ([facility])  1/27/22  History     Chief Complaint   Patient presents with     Addiction Problem     using fentanyl for a long time, snorting, usually 10-25 pills daily, lately 5-10 pills daily, ready to be sober, does not want suboxone as she doesn't like the side effects     The history is provided by the patient.     Russell Lynn is a 21 year old female who has a significant medical history of opioid dependency who presents to the Emergency Department seeking detox from opioids.  Patient states that she was directed to the ED by chemical dependency clinic for checkup and to address health concerns.  She states that she is on the verge of withdrawing, she no longer seeking care at the chemical dependency clinic as she dislikes using Suboxone or methadone.  She is seeking program in which she can be monitored for 30 days or so.  She states that while and opioid withdrawal, she experiences psychosis.  Her last fentanyl use was between 10 and 11 PM last night.    Past Medical History  History reviewed. No pertinent past medical history.  History reviewed. No pertinent surgical history.  cloNIDine (CATAPRES) 0.1 MG tablet  gabapentin (NEURONTIN) 300 MG capsule  QUEtiapine (SEROQUEL) 25 MG tablet  QUEtiapine (SEROQUEL) 25 MG tablet  buprenorphine HCl-naloxone HCl (SUBOXONE) 8-2 MG per film  hydrOXYzine (ATARAX) 50 MG tablet  ibuprofen (ADVIL/MOTRIN) 800 MG tablet  naloxone (NARCAN) 4 MG/0.1ML nasal spray  ondansetron (ZOFRAN) 8 MG tablet  prazosin (MINIPRESS) 1 MG capsule      No Known Allergies  Family History  History reviewed. No pertinent family history.  Social History   Social History     Tobacco Use     Smoking status: Current Every Day Smoker     Packs/day: 0.50     Types: Cigarettes     Smokeless tobacco: Never Used     Tobacco comment: vapes   Substance Use Topics     Alcohol use: Not Currently     Drug use: Yes     Types: Fentanyl, Cocaine, Marijuana      Comment: 11/6/21 Fentanyl and cocaine      Past medical history, past surgical history, medications, allergies, family history, and social history were reviewed with the patient. No additional pertinent items.     I have reviewed the Medications, Allergies, Past Medical and Surgical History, and Social History in the Epic system.    Review of Systems   Constitutional: Negative for fever.   HENT: Negative for congestion.    Eyes: Negative for redness.   Respiratory: Negative for shortness of breath.    Cardiovascular: Negative for chest pain.   Gastrointestinal: Negative for abdominal pain.   Genitourinary: Negative for difficulty urinating.   Musculoskeletal: Negative for arthralgias and neck stiffness.   Skin: Negative for color change.   Neurological: Negative for headaches.   Psychiatric/Behavioral: Negative for confusion.   All other systems reviewed and are negative.    A complete review of systems was performed with pertinent positives and negatives noted in the HPI, and all other systems negative.    Physical Exam   BP: 123/73  Pulse: 103  Temp: 98.4  F (36.9  C)  Resp: 16  Weight: 56.7 kg (125 lb)  SpO2: 99 %      Physical Exam  Constitutional:       General: She is not in acute distress.     Appearance: She is not diaphoretic.   HENT:      Head: Atraumatic.      Mouth/Throat:      Pharynx: No oropharyngeal exudate.   Eyes:      General: No scleral icterus.     Pupils: Pupils are equal, round, and reactive to light.   Cardiovascular:      Rate and Rhythm: Normal rate and regular rhythm.      Heart sounds: Normal heart sounds. No murmur heard.  No friction rub. No gallop.    Pulmonary:      Effort: Pulmonary effort is normal. No respiratory distress.      Breath sounds: Normal breath sounds. No stridor. No wheezing, rhonchi or rales.   Chest:      Chest wall: No tenderness.   Abdominal:      General: Abdomen is flat. Bowel sounds are normal. There is no distension.      Palpations: Abdomen is soft. There is  no mass.      Tenderness: There is no abdominal tenderness. There is no right CVA tenderness, left CVA tenderness, guarding or rebound.      Hernia: No hernia is present.   Musculoskeletal:         General: No tenderness.      Cervical back: Neck supple.   Skin:     General: Skin is warm.      Findings: No rash.   Neurological:      General: No focal deficit present.      Cranial Nerves: No cranial nerve deficit.   Psychiatric:         Mood and Affect: Mood normal.         Thought Content: Thought content normal.         ED Course     At 4:20 PM the patient was seen and examined by Terra Escalera Md in Room ED02.        Procedures             No results found for this or any previous visit (from the past 24 hour(s)).  Medications - No data to display          Assessments & Plan (with Medical Decision Making)   Russell Lynn is a 21 year old female with continuing opioid use disorder with fentanyl, last use 10-11pm last night, not interested in suboxone nor methadone, wish to have refill of gabapentin and clonidine and being monitored for withdraw-informed that we do not do inpatient detox for OUD, but [facility] with [facility] as she knew, but she can get rule 25 then CD program to be monitored for OUD. Given clonidine and gabapentin and also seroquel to help her sleep.    I have reviewed the nursing notes.    I have reviewed the findings, diagnosis, plan and need for follow up with the patient.    Discharge Medication List as of 1/27/2022  4:41 PM      START taking these medications    Details   !! QUEtiapine (SEROQUEL) 25 MG tablet Take 1-2 tablets (25-50 mg) by mouth nightly as needed (insomnia), Disp-20 tablet, R-0, E-Prescribe      !! QUEtiapine (SEROQUEL) 25 MG tablet Take 1-2 tablets (25-50 mg) by mouth nightly as needed (insomnia), Disp-20 tablet, R-0, Local Print       !! - Potential duplicate medications found. Please discuss with provider.          Final diagnoses:   Opioid use disorder        I, Obey Reyna am serving as a trained medical scribe to document services personally performed by Terra Escalera Md, based on the provider's statements to me.      I, Terra Escalera Md, was physically present and have reviewed and verified the accuracy of this note documented by Obey Reyna.     Terra Escalera Md  1/27/2022   [facility] EMERGENCY DEPARTMENT     Terra Escalera MD  01/27/22 2050

## 2022-02-24 ENCOUNTER — HOSPITAL ENCOUNTER (OUTPATIENT)
Dept: BEHAVIORAL HEALTH | Facility: CLINIC | Age: 22
Discharge: HOME | End: 2022-02-24
Attending: FAMILY MEDICINE | Admitting: FAMILY MEDICINE
Payer: COMMERCIAL

## 2022-02-24 VITALS — WEIGHT: 130 LBS | HEIGHT: 66 IN | BODY MASS INDEX: 20.89 KG/M2

## 2022-02-24 PROCEDURE — H0001 ALCOHOL AND/OR DRUG ASSESS: HCPCS | Mod: GT,95 | Performed by: COUNSELOR

## 2022-02-24 ASSESSMENT — PAIN SCALES - GENERAL: PAINLEVEL: NO PAIN (0)

## 2022-02-24 ASSESSMENT — COLUMBIA-SUICIDE SEVERITY RATING SCALE - C-SSRS
1. HAVE YOU WISHED YOU WERE DEAD OR WISHED YOU COULD GO TO SLEEP AND NOT WAKE UP?: YES
1. IN THE PAST MONTH, HAVE YOU WISHED YOU WERE DEAD OR WISHED YOU COULD GO TO SLEEP AND NOT WAKE UP?: NO PLAN
ATTEMPT LIFETIME: YES
1. IN THE PAST MONTH, HAVE YOU WISHED YOU WERE DEAD OR WISHED YOU COULD GO TO SLEEP AND NOT WAKE UP?: YES
ATTEMPT PAST THREE MONTHS: NO

## 2022-02-24 ASSESSMENT — ANXIETY QUESTIONNAIRES
7. FEELING AFRAID AS IF SOMETHING AWFUL MIGHT HAPPEN: NEARLY EVERY DAY
6. BECOMING EASILY ANNOYED OR IRRITABLE: NEARLY EVERY DAY
GAD7 TOTAL SCORE: 21
1. FEELING NERVOUS, ANXIOUS, OR ON EDGE: NEARLY EVERY DAY
2. NOT BEING ABLE TO STOP OR CONTROL WORRYING: NEARLY EVERY DAY
4. TROUBLE RELAXING: NEARLY EVERY DAY
3. WORRYING TOO MUCH ABOUT DIFFERENT THINGS: NEARLY EVERY DAY
5. BEING SO RESTLESS THAT IT IS HARD TO SIT STILL: NEARLY EVERY DAY

## 2022-02-24 ASSESSMENT — PATIENT HEALTH QUESTIONNAIRE - PHQ9: SUM OF ALL RESPONSES TO PHQ QUESTIONS 1-9: 24
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
## 2022-02-25 ASSESSMENT — ANXIETY QUESTIONNAIRES: GAD7 TOTAL SCORE: 21

## 2022-03-02 ENCOUNTER — OFFICE VISIT (OUTPATIENT)
Dept: BEHAVIORAL HEALTH | Facility: CLINIC | Age: 22
End: 2022-03-02
Payer: COMMERCIAL

## 2022-03-02 VITALS — DIASTOLIC BLOOD PRESSURE: 57 MMHG | SYSTOLIC BLOOD PRESSURE: 100 MMHG | HEART RATE: 74 BPM

## 2022-03-02 DIAGNOSIS — F11.93 OPIOID WITHDRAWAL (H): Primary | ICD-10-CM

## 2022-03-02 DIAGNOSIS — F11.20 OPIOID USE DISORDER, SEVERE, DEPENDENCE (H): ICD-10-CM

## 2022-03-02 DIAGNOSIS — F17.200 TOBACCO USE DISORDER: ICD-10-CM

## 2022-03-02 LAB — HCG UR QL: NEGATIVE

## 2022-03-02 PROCEDURE — 81025 URINE PREGNANCY TEST: CPT | Performed by: NURSE PRACTITIONER

## 2022-03-02 PROCEDURE — 99214 OFFICE O/P EST MOD 30 MIN: CPT | Performed by: NURSE PRACTITIONER

## 2022-03-02 RX ORDER — GABAPENTIN 300 MG/1
300 CAPSULE ORAL 3 TIMES DAILY PRN
Qty: 42 CAPSULE | Refills: 0 | Status: SHIPPED | OUTPATIENT
Start: 2022-03-02

## 2022-03-02 RX ORDER — BUPRENORPHINE AND NALOXONE 8; 2 MG/1; MG/1
FILM, SOLUBLE BUCCAL; SUBLINGUAL
Qty: 20 FILM | Refills: 0 | COMMUNITY
Start: 2022-03-02

## 2022-03-02 NOTE — NURSING NOTE
[facility]      Rooming information:  Approximate last use of illicit opioids: 1/27/22  Taking buprenorphine? Yes:  As prescribed? Yes:   Number of buprenorphine films/tablets remaining currently: 26  Side effects related to buprenorphine (constipation, dry mouth, sedation?) Yes: dry mouth and  Constipation   Narcan currently available: Yes  Other recent substance use:    Denies  NICOTINE-No      Point of care urine drug screen positive for: buprenorphine, benzodiazepines and THC  *POC urine drug screen does not screen for Fentanyl    Pregnancy Status   LMP: unknown  Birth control/barriers: denies  Interested in birth control if none currently? No  Urine pregnancy test specimen obtained and sent to lab:yes    PHQ-2 Score:     PHQ-2 ( 2000 [employer]) 3/2/2022 1/4/2022   Q1: Little interest or pleasure in doing things 3 3   Q2: Feeling down, depressed or hopeless 3 3   PHQ-2 Score 6 6        If PHQ-2 score of 3 or higher, has Recovery Clinic therapist or provider been notified? Yes    Any current suicidal ideation? No  If yes, has Recovery Clinic therapist or provider been notified? N/A    Primary care provider: Physician No Ref-Primary     Mental health provider: leonid (follow up on MH referral if needed)    Insurance needs: active    Housing needs: stable    Contact information up to date? yes    3rd Party Involvement none today (please obtain DORA if pt would like to include)    Marie Lau CMA  March 2, 2022  10:45 AM

## 2022-03-02 NOTE — PROGRESS NOTES
[facility] Follow Up    ASSESSMENT/PLAN                                                      1. Opioid withdrawal (H)  - Discussed use of comfort medication for withdrawal as needed. Patient has previous prescriptions from Dr. Howell including Hydroxyzine, seroquel, and clonidine.   - gabapentin (NEURONTIN) 300 MG capsule; Take 1 capsule (300 mg) by mouth 3 times daily as needed for other (restless legs or leg pain or anixety)  Dispense: 42 capsule; Refill: 0    2. Opioid use disorder, severe, dependence (H)  - Pt plans to start IOP treatment at [facility] tomorrow.   - Follow up in 13 days or earlier if needed, pt has 26 films (13 days supply)   - Continue taking Suboxone 8-2 mg film SL BID   - Patient has Narcan   - buprenorphine HCl-naloxone HCl (SUBOXONE) 8-2 MG per film; Take 1/4 film when at least 24hrs from last use; repeat dose q1-2 hours max 2 films day one; day two start 1 sl bid  Dispense: 20 Film; Refill: 0    3. Tobacco use disorder  - did not discuss today      Return in about 12 days (around 3/14/2022) for Follow up, in person. or earlier if needed.     SUBJECTIVE                                                        CC/HPI:  Russell Lynn is a 21 year old female with PMH anxiety, depression, PTSD, and opioid use disorder who presents to the [facility] for return visit.       Brief History:  Pt was first evaluated in [facility] on 9/17/21.  At that time she reported ~3 yr h/o intranasal fentanyl use.  She started buprenorphine through Recovery Clinic on 9/17/21. She was lost to follow up 9/17-11/8, and again 11/8-1/4/21.  Restarted on Suboxone 1/4/22. Presenting to ED on 1/27/22 for opiate withdrawal requesting to be admitted for detox. Started treatment at [facility] 3/3/22.      Substance Use History :  Opioids: First use: 11/2018    Most recent use: 1/27/22 substance: perc 30/fentanyl quantity 4-5 pills daily route:intranasal                IV drug  use: No   History of overdose: Yes: 5/2020  Previous treatments : Yes: methadone x 1 1/2 yrs through [facility]; stopped methadone abruptly ~5/2021  Longest period of sobriety: 8 months  Medical complications related to substance use: denies  Hepatitis C Status  9/24/21 HCV ab nonreactive  HIV Status 9/24/21 HIV ag/ab nonreactive     Other recent substance use:  Stimulants: occasional use of methamphetamine  Sedatives/hypnotics/anxiolytics:last used a year ago  Alcohol:denies  Tobacco:currently  Cannabis:currently  Hallucinogens:last used 2-4 weeks ago  Behavioral addictions: denies      Most recent [facility] visit 1/4/22    A/P from that visit:   1. Opioid use disorder, severe, dependence (H)  Pt reporting last use of fentanyl 1/3/21 1600.   Experiencing precipitated withdrawal after taking buprenorphine 16mg in AM of 1/4/22.    Discussed meds below to mitigate withdrawal symptoms  Reviewed instructions for starting buprenorphine when >24hrs from last use of fentanyl  Pt was given resources for housing and met with   Pt did schedule chemical health assessment for 1/6/22, virtual appointment  - HCG qualitative urine  - buprenorphine HCl-naloxone HCl (SUBOXONE) 8-2 MG per film; Take 1/4 film when at least 24hrs from last use; repeat dose q1-2 hours max 2 films day one; day two start 1 sl bid  Dispense: 20 Film; Refill: 0  - naloxone (NARCAN) 4 MG/0.1ML nasal spray; Spray 1 spray (4 mg) into one nostril alternating nostrils once as needed for opioid reversal every 2-3 minutes until assistance arrives  Dispense: 0.2 mL; Refill: 11     2. Opioid withdrawal (H)  Pt experiencing precipitated withdrawal after taking buprenorphine 16mg ~12 hrs from last use of fentanyl  Reviewed use of medications below to start now  - cloNIDine (CATAPRES) 0.1 MG tablet; Take 1 tablet (0.1 mg) by mouth every 6 hours as needed (withdrawal symptoms including hot/cold sweats, anxiety, restlessness, sleeplessness)   Dispense: 20 tablet; Refill: 0  - gabapentin (NEURONTIN) 300 MG capsule; Take 1 capsule (300 mg) by mouth every 6 hours as needed (withdrawal symptoms including anxiety, restlessness, sleeplessness)  Dispense: 20 capsule; Refill: 0  - ondansetron (ZOFRAN) 8 MG tablet; Take 0.5-1 tablets (4-8 mg) by mouth every 8 hours as needed for nausea  Dispense: 15 tablet; Refill: 0  - hydrOXYzine (ATARAX) 50 MG tablet; Take 1 tablet (50 mg) by mouth every 6 hours as needed (withdrawal symptoms including runny nose, watery eyes, anxiety, sleeplessness)  Dispense: 40 tablet; Refill: 0     3. Tobacco use disorder  Not ready to quit at this time     Return for Follow up, in person in 7-10 days.    Today, pt states:    She is in a hurry because her ride is here and just wanted to stop in to check in. Reports he is starting treatment at [facility] tomorrow morning. She has 26 films of 8-2 mg Suboxone that she had from a Previous prescription she never took from Dr. Howell. She had gone to the ED on the 27 th for opioid withdrawal, she relapsed a couple times and went to detox a couple times. She went to [facility] for detox. Reports her last use was 1/27/22. Denies cravings. She is starting [facility] tomorrow morning. She is taking 2-3 films per day of 8-2 mg films. She has been taking it consistently for 1 week. Still reporting restless legs and anxiety. Denies other opioid withdrawal symptoms. Without other complaint.    [place] Prescription Drug Monitoring Program Reviewed:  Yes  01/04/2022  Suboxone 8 Mg-2 MG SL Film    20.00  10     01/27/2022  Gabapentin 300 MG Capsule    20.00  5       Medications:  buprenorphine HCl-naloxone HCl (SUBOXONE) 8-2 MG per film, Take 1/4 film when at least 24hrs from last use; repeat dose q1-2 hours max 2 films day one; day two start 1 sl bid  ibuprofen (ADVIL/MOTRIN) 800 MG tablet, Take 1 tablet (800 mg) by mouth every 8 hours as needed for moderate pain  ondansetron (ZOFRAN) 8 MG  tablet, Take 0.5-1 tablets (4-8 mg) by mouth every 8 hours as needed for nausea  QUEtiapine (SEROQUEL) 25 MG tablet, Take 1-2 tablets (25-50 mg) by mouth nightly as needed (insomnia)  cloNIDine (CATAPRES) 0.1 MG tablet, Take 1 tablet (0.1 mg) by mouth every 6 hours as needed (withdrawal symptoms) (Patient not taking: Reported on 2/24/2022)  hydrOXYzine (ATARAX) 50 MG tablet, Take 1 tablet (50 mg) by mouth every 6 hours as needed (withdrawal symptoms including runny nose, watery eyes, anxiety, sleeplessness) (Patient not taking: Reported on 2/24/2022)  naloxone (NARCAN) 4 MG/0.1ML nasal spray, Spray 1 spray (4 mg) into one nostril alternating nostrils once as needed for opioid reversal every 2-3 minutes until assistance arrives (Patient not taking: Reported on 2/24/2022)  prazosin (MINIPRESS) 1 MG capsule, Take 1-3 capsules (1-3 mg) by mouth At Bedtime As needed for nightmares (Patient not taking: Reported on 1/4/2022)  QUEtiapine (SEROQUEL) 25 MG tablet, Take 1-2 tablets (25-50 mg) by mouth nightly as needed (insomnia) (Patient not taking: Reported on 2/24/2022)    No current facility-administered medications on file prior to visit.      No Known Allergies    PMH, PSH, FamHx reviewed  PAST PSYCHIATRIC HISTORY:  Diagnoses- PTSD, depression and anxiety   Suicide Attempts: Yes 2020  Hospitalizations: Yes 2020     Social History  Housing status: no permanent address; staying with friends  Employment status: Unemployed, not seeking work  Relationship status: has a boyfriend  Children: 1 child     REVIEW OF SYSTEMS:  No other concerns    OBJECTIVE                                                      /57   Pulse 74   LMP  (LMP Unknown)     Physical Exam  Constitutional:       General: She is not in acute distress.     Appearance: Normal appearance. She is not diaphoretic.   HENT:      Head: Normocephalic and atraumatic.   Eyes:      General: No scleral icterus.     Extraocular Movements: Extraocular movements  intact.      Pupils: Pupils are equal, round, and reactive to light.   Cardiovascular:      Rate and Rhythm: Normal rate.   Pulmonary:      Effort: Pulmonary effort is normal.   Neurological:      General: No focal deficit present.      Mental Status: She is alert and oriented to person, place, and time.      Motor: No weakness or tremor.      Coordination: Coordination normal.      Gait: Gait normal.   Psychiatric:         Attention and Perception: Perception normal. She is inattentive.         Mood and Affect: Mood and affect normal. Mood is not anxious or depressed. Affect is not flat.         Speech: Speech normal. Speech is not rapid and pressured or slurred.         Behavior: Behavior is not agitated. Behavior is cooperative.         Thought Content: Thought content normal.         Cognition and Memory: Cognition and memory normal.      Comments: Insight and judgment are fair       Labs:    UDS: positive buprenorphine, benzodiazepines and THC  - Pt received valium in detox - positive as expected   *[facility] urine drug screen does not screen for Fentanyl    No results found for this or any previous visit (from the past 240 hour(s)).      Patient counseling completed today:  Discussed mechanism of action, potential risks/benefits/side effects of medications and other recommendations above.  Recommend pt keep naloxone in their possession and reviewed other aspects of harm reduction to reduce risk of overdose with return to use.   Recommended avoiding concurrent use of alcohol, benzodiazepines or other sedatives with buprenorphine or other opioids.  Discussed importance of avoiding isolation, building a network of supportive relationships, avoiding people/places/things associated with past use to reduce risk of relapse; including motivational interviewing regarding psychosocial treatment for addiction.     At least 30 min spent in review of medical record,  review, obtaining histories, reviewing symptoms, discussing  pt's goals for treatment, counseling noted above.  BRISEYDA Archuleta CNP on 3/2/2022 at 11:24 AM      [place] [id number] [place] [address] [id number] [phone]

## 2022-03-27 ENCOUNTER — HEALTH MAINTENANCE LETTER (OUTPATIENT)
Age: 22
End: 2022-03-27

## 2022-09-24 ENCOUNTER — HEALTH MAINTENANCE LETTER (OUTPATIENT)
Age: 22
End: 2022-09-24

## 2023-02-17 NOTE — NURSING NOTE
[facility]      Rooming information:  Approximate last use of illicit opioids: 01/03/2022  Taking buprenorphine? Yes:  As prescribed? No  Number of buprenorphine films/tablets remaining currently: 0  Side effects related to buprenorphine (constipation, dry mouth, sedation?) No  Narcan currently available: Yes  Other recent substance use:    Denies  NICOTINE-Yes: Cigarettes  If using nicotine, ready to quit? No    Point of care urine drug screen positive for: buprenorphine and THC  *[facility] urine drug screen does not screen for Fentanyl    Pregnancy Status   LMP: Unknown  Birth control/barriers: Denies  Interested in birth control if none currently? No  Urine pregnancy test specimen obtained and sent to lab:Yes    PHQ-2 Score:     PHQ-2 ( 1999 [employer]) 1/4/2022 11/24/2021   Q1: Little interest or pleasure in doing things 3 3   Q2: Feeling down, depressed or hopeless 3 2   PHQ-2 Score 6 5        If PHQ-2 score of 3 or higher, has [facility] therapist or provider been notified? Yes    Any current suicidal ideation? No  If yes, has [facility] therapist or provider been notified? N/A    Primary care provider: Physician No Ref-Primary     Mental health provider: Denies (follow up on MH referral if needed)    Insurance needs: Active    Housing needs: Homeless    Contact information up to date? Yes    3rd Party Involvement None today (please obtain DORA if pt would like to include)    Marielos Lane RN  January 4, 2022  11:46 AM   Patient called and complained of urgency and pain with urination on Wednesday. Patient states OTC is not helping with the discomfort. Urine testing was ordered but the culture is still pending, Patient would like to be treated. U/A is available.

## 2023-05-08 ENCOUNTER — HEALTH MAINTENANCE LETTER (OUTPATIENT)
Age: 23
End: 2023-05-08

## 2024-05-11 ENCOUNTER — HEALTH MAINTENANCE LETTER (OUTPATIENT)
Age: 24
End: 2024-05-11